# Patient Record
Sex: MALE | Race: WHITE | Employment: UNEMPLOYED | URBAN - METROPOLITAN AREA
[De-identification: names, ages, dates, MRNs, and addresses within clinical notes are randomized per-mention and may not be internally consistent; named-entity substitution may affect disease eponyms.]

---

## 2018-01-03 ENCOUNTER — HOSPITAL ENCOUNTER (OUTPATIENT)
Dept: SPEECH THERAPY | Age: 58
Discharge: HOME | End: 2018-01-03
Attending: INTERNAL MEDICINE | Admitting: INTERNAL MEDICINE

## 2018-01-03 DIAGNOSIS — R13.12 DYSPHAGIA, OROPHARYNGEAL: ICD-10-CM

## 2018-01-08 ENCOUNTER — PROCEDURE VISIT (OUTPATIENT)
Dept: CARDIOLOGY CLINIC | Age: 58
End: 2018-01-08

## 2018-01-08 ENCOUNTER — OFFICE VISIT (OUTPATIENT)
Dept: CARDIOLOGY CLINIC | Age: 58
End: 2018-01-08

## 2018-01-08 VITALS
DIASTOLIC BLOOD PRESSURE: 90 MMHG | HEART RATE: 78 BPM | BODY MASS INDEX: 46.48 KG/M2 | SYSTOLIC BLOOD PRESSURE: 148 MMHG | WEIGHT: 289.2 LBS | HEIGHT: 66 IN

## 2018-01-08 DIAGNOSIS — R01.1 CARDIAC MURMUR: ICD-10-CM

## 2018-01-08 DIAGNOSIS — I10 ESSENTIAL HYPERTENSION: ICD-10-CM

## 2018-01-08 DIAGNOSIS — R06.02 SOB (SHORTNESS OF BREATH): Primary | ICD-10-CM

## 2018-01-08 LAB
LV EF: 58 %
LVEF MODALITY: NORMAL

## 2018-01-08 PROCEDURE — G8427 DOCREV CUR MEDS BY ELIG CLIN: HCPCS | Performed by: INTERNAL MEDICINE

## 2018-01-08 PROCEDURE — G8484 FLU IMMUNIZE NO ADMIN: HCPCS | Performed by: INTERNAL MEDICINE

## 2018-01-08 PROCEDURE — 3017F COLORECTAL CA SCREEN DOC REV: CPT | Performed by: INTERNAL MEDICINE

## 2018-01-08 PROCEDURE — G8417 CALC BMI ABV UP PARAM F/U: HCPCS | Performed by: INTERNAL MEDICINE

## 2018-01-08 PROCEDURE — 93306 TTE W/DOPPLER COMPLETE: CPT | Performed by: INTERNAL MEDICINE

## 2018-01-08 PROCEDURE — 4004F PT TOBACCO SCREEN RCVD TLK: CPT | Performed by: INTERNAL MEDICINE

## 2018-01-08 PROCEDURE — 99203 OFFICE O/P NEW LOW 30 MIN: CPT | Performed by: INTERNAL MEDICINE

## 2018-01-08 RX ORDER — BUPROPION HYDROCHLORIDE 150 MG/1
150 TABLET, EXTENDED RELEASE ORAL 2 TIMES DAILY
Refills: 0 | COMMUNITY
Start: 2017-12-06

## 2018-01-08 RX ORDER — ATORVASTATIN CALCIUM 80 MG/1
80 TABLET, FILM COATED ORAL DAILY
Refills: 0 | COMMUNITY
Start: 2017-12-06

## 2018-01-08 NOTE — LETTER
Cardiology 100 [facility] [address] [facility] [address]  Phone: [phone]  Fax: [phone]    Montse Hidalgo MD        January 8, 2018     [address] [place] [address]    Patient: Hebert Crowley  MR Number: [id number]  YOB: 1960  Date of Visit: 1/8/2018    Dear Dr. Ann Rivera:    Thank you for the request for consultation for Joe Lopez to me for the evaluation of SOB  . Below are the relevant portions of my assessment and plan of care. If you have questions, please do not hesitate to call me. I look forward to following Minerva Citizen along with you.     Sincerely,        Montse Hidalgo MD

## 2018-01-08 NOTE — PATIENT INSTRUCTIONS
Please remember to bring all medication bottles or a medication list with you to your appointment. If you have any questions, please call our office at [phone]. Will check non- invasive Cardiac testing. OV for test results. Check Echo & Cardiolite perfusion imaging. Further recommendations based on test results.

## 2018-01-09 ENCOUNTER — TELEPHONE (OUTPATIENT)
Dept: CARDIOLOGY CLINIC | Age: 58
End: 2018-01-09

## 2018-01-17 ENCOUNTER — PROCEDURE VISIT (OUTPATIENT)
Dept: CARDIOLOGY CLINIC | Age: 58
End: 2018-01-17

## 2018-01-17 DIAGNOSIS — I10 ESSENTIAL HYPERTENSION: ICD-10-CM

## 2018-01-17 DIAGNOSIS — R06.02 SOB (SHORTNESS OF BREATH): ICD-10-CM

## 2018-01-17 DIAGNOSIS — R01.1 CARDIAC MURMUR: ICD-10-CM

## 2018-01-17 LAB
LV EF: 50 %
LVEF MODALITY: NORMAL

## 2018-01-17 PROCEDURE — 93018 CV STRESS TEST I&R ONLY: CPT | Performed by: INTERNAL MEDICINE

## 2018-01-17 PROCEDURE — A9500 TC99M SESTAMIBI: HCPCS | Performed by: INTERNAL MEDICINE

## 2018-01-17 PROCEDURE — 93016 CV STRESS TEST SUPVJ ONLY: CPT | Performed by: INTERNAL MEDICINE

## 2018-01-17 PROCEDURE — 78452 HT MUSCLE IMAGE SPECT MULT: CPT | Performed by: INTERNAL MEDICINE

## 2018-01-17 PROCEDURE — 93017 CV STRESS TEST TRACING ONLY: CPT | Performed by: INTERNAL MEDICINE

## 2018-01-18 ENCOUNTER — TELEPHONE (OUTPATIENT)
Dept: CARDIOLOGY CLINIC | Age: 58
End: 2018-01-18

## 2018-01-19 ENCOUNTER — OFFICE VISIT (OUTPATIENT)
Dept: CARDIOLOGY CLINIC | Age: 58
End: 2018-01-19

## 2018-01-19 ENCOUNTER — TELEPHONE (OUTPATIENT)
Dept: CARDIOLOGY CLINIC | Age: 58
End: 2018-01-19

## 2018-01-19 VITALS
SYSTOLIC BLOOD PRESSURE: 138 MMHG | HEART RATE: 80 BPM | DIASTOLIC BLOOD PRESSURE: 90 MMHG | WEIGHT: 289.8 LBS | HEIGHT: 66 IN | BODY MASS INDEX: 46.57 KG/M2

## 2018-01-19 DIAGNOSIS — I10 ESSENTIAL HYPERTENSION: ICD-10-CM

## 2018-01-19 DIAGNOSIS — I38 VHD (VALVULAR HEART DISEASE): ICD-10-CM

## 2018-01-19 DIAGNOSIS — R94.39 ABNORMAL NUCLEAR STRESS TEST: Primary | ICD-10-CM

## 2018-01-19 DIAGNOSIS — R01.1 CARDIAC MURMUR: ICD-10-CM

## 2018-01-19 DIAGNOSIS — R06.02 SOB (SHORTNESS OF BREATH): ICD-10-CM

## 2018-01-19 DIAGNOSIS — R42 DIZZINESS: ICD-10-CM

## 2018-01-19 PROCEDURE — 3017F COLORECTAL CA SCREEN DOC REV: CPT | Performed by: INTERNAL MEDICINE

## 2018-01-19 PROCEDURE — G8484 FLU IMMUNIZE NO ADMIN: HCPCS | Performed by: INTERNAL MEDICINE

## 2018-01-19 PROCEDURE — 4004F PT TOBACCO SCREEN RCVD TLK: CPT | Performed by: INTERNAL MEDICINE

## 2018-01-19 PROCEDURE — G8427 DOCREV CUR MEDS BY ELIG CLIN: HCPCS | Performed by: INTERNAL MEDICINE

## 2018-01-19 PROCEDURE — G8417 CALC BMI ABV UP PARAM F/U: HCPCS | Performed by: INTERNAL MEDICINE

## 2018-01-19 PROCEDURE — 99214 OFFICE O/P EST MOD 30 MIN: CPT | Performed by: INTERNAL MEDICINE

## 2018-01-19 RX ORDER — FLUTICASONE FUROATE AND VILANTEROL TRIFENATATE 100; 25 UG/1; UG/1
POWDER RESPIRATORY (INHALATION)
Refills: 0 | COMMUNITY
Start: 2018-01-12

## 2018-01-19 RX ORDER — LISINOPRIL 10 MG/1
TABLET ORAL
Refills: 0 | COMMUNITY
Start: 2018-01-12

## 2018-01-19 RX ORDER — ASPIRIN 81 MG/1
81 TABLET ORAL DAILY
Qty: 30 TABLET | Refills: 5 | Status: SHIPPED | OUTPATIENT
Start: 2018-01-19 | End: 2019-02-26

## 2018-01-19 NOTE — PATIENT INSTRUCTIONS
CAD:Abnormal nuclear stress test  -     CORONARY ARTERY DISEASE: Patient is currently  symptomatic from CAD. - changes in  treatment:   yes, add ASA           - Testing ordered:  yes, Cath  Winston classification: 2  FRAMINGHAM RISK SCORE:  LIANG RISK SCORE:  HTN:well controlled on current medical regimen, see list above. - changes in  treatment:   no   CARDIOMYOPATHY: None known   CONGESTIVE HEART FAILURE: NO KNOWN HISTORY.     VHD: Moderate AI  DYSLIPIDEMIA: Patient's profile is not available  OTHER RELEVANT DIAGNOSIS:as noted in patient's active problem list:Morbid Obesity  TESTS ORDERED: Aortic root angiography, Left & right heart Cath Possible PCI. Check Lipid profile. All previously ordered tests reviewed. ARRHYTHMIAS: None known   MEDICATIONS: CPM. Start taking baby ASA daily. Add Metoprolol 25 mg BID to regimen. Office f/u in six months. Primary/secondary prevention is the goal by aggressive risk modification, healthy and therapeutic life style changes for cardiovascular risk reduction. Various goals are discussed and multiple questions answered.

## 2018-01-19 NOTE — LETTER
This form has been fully explained to me. I understand its contents. Patients Signature: ___________________________________DATE/TIME  [email or web address]    If patient unable to sign, has engaged the [address] [place] [address], is a minor, or has a court-appointed Guardian:  [facility] Representative Name (Print):  ____________________________________      Relationship (Ashford one):    Guardian   Parent    Spouse    HCPOA   Child   Sibling  Next-of-Kin Friend    Patients Representative Signature: _______________________________________              Date: ______________  Time: __________    An  was used.    name/ID: _________________________________      [address] Witness________________________________ DATE/TIME  [email or web address]      Physician/Practitioner _______________________________  DATE/TIME  [email or web address]         Revision 6-                                          Isaias Allred    PROCEDURE TO SCHEDULE:    LEFT HEART CATHETERIZATION & RIGHT HEART CATHETERIZATION WITH POSSIBLE PERCUTANEOUS CORONARY INTERVENTION WITH AROTIC ROOT       Patient Name: Sg Zheng   : 1960   MRN# [id number]    Home Phone Number: [phone]   Weight:    Wt Readings from Last 3 Encounters:   18 289 lb 12.8 oz (131.5 kg)   18 289 lb 3.2 oz (131.2 kg)   18 285 lb (129.3 kg)        Insurance: Payor: Sandra York / Plan: Deyvi Wilson / Product Type: *No Product type* /     Date of Procedure: 2018 Time: 0800 Arrival Time: 0600    Diagnosis:  ABNORMAL STRESS Allergies: No Known Allergies     1) Call [facility] scheduling ([phone]) or Instant Message  CONFIRMED WITH     PHONE OR   INSTANT MESSAGE  2) PREAUTHORIZATION NUMBER:    Spoke to:      From date:     expiration date:        Sherrilyn School

## 2018-01-19 NOTE — ADDENDUM NOTE
Encounter addended by: Jayda Liu CMA on: 1/19/2018 11:53 AM<BR>    Actions taken: Letter status changed

## 2018-01-19 NOTE — PROGRESS NOTES
All previously ordered tests reviewed. ARRHYTHMIAS: None known   MEDICATIONS: CPM. Start taking baby ASA daily. Add Metoprolol 25 mg BID to regimen. Office f/u in six months. Primary/secondary prevention is the goal by aggressive risk modification, healthy and therapeutic life style changes for cardiovascular risk reduction. Various goals are discussed and multiple questions answered.

## 2018-01-24 ENCOUNTER — HOSPITAL ENCOUNTER (OUTPATIENT)
Dept: GENERAL RADIOLOGY | Age: 58
Discharge: HOME | End: 2018-01-24
Attending: INTERNAL MEDICINE | Admitting: INTERNAL MEDICINE

## 2018-01-24 ENCOUNTER — HOSPITAL ENCOUNTER (OUTPATIENT)
Dept: SLEEP CENTER | Age: 58
Discharge: HOME | End: 2018-01-24
Attending: INTERNAL MEDICINE | Admitting: INTERNAL MEDICINE

## 2018-01-24 ENCOUNTER — SURG/PROC ORDERS (OUTPATIENT)
Dept: CARDIOLOGY | Age: 58
End: 2018-01-24

## 2018-01-24 VITALS
SYSTOLIC BLOOD PRESSURE: 121 MMHG | WEIGHT: 292 LBS | DIASTOLIC BLOOD PRESSURE: 69 MMHG | HEART RATE: 68 BPM | HEIGHT: 66 IN | BODY MASS INDEX: 46.93 KG/M2

## 2018-01-24 DIAGNOSIS — G47.33 OSA (OBSTRUCTIVE SLEEP APNEA): Primary | ICD-10-CM

## 2018-01-24 LAB
ANION GAP SERPL CALCULATED.3IONS-SCNC: 14 MMOL/L (ref 4–16)
APTT: 26.8 SECONDS (ref 21.2–33)
BUN BLDV-MCNC: 14 MG/DL (ref 6–23)
CALCIUM SERPL-MCNC: 9.1 MG/DL (ref 8.3–10.6)
CHLORIDE BLD-SCNC: 104 MMOL/L (ref 99–110)
CHOLESTEROL: 153 MG/DL
CO2: 26 MMOL/L (ref 21–32)
CREAT SERPL-MCNC: 0.9 MG/DL (ref 0.9–1.3)
GFR AFRICAN AMERICAN: >60 ML/MIN/1.73M2
GFR NON-AFRICAN AMERICAN: >60 ML/MIN/1.73M2
GLUCOSE BLD-MCNC: 89 MG/DL (ref 70–99)
HCT VFR BLD CALC: 45.7 % (ref 42–52)
HDLC SERPL-MCNC: 50 MG/DL
HEMOGLOBIN: 14.9 GM/DL (ref 13.5–18)
INR BLD: 0.9 INDEX
LDL CHOLESTEROL DIRECT: 88 MG/DL
MCH RBC QN AUTO: 29.3 PG (ref 27–31)
MCHC RBC AUTO-ENTMCNC: 32.6 % (ref 32–36)
MCV RBC AUTO: 90 FL (ref 78–100)
PDW BLD-RTO: 13.2 % (ref 11.7–14.9)
PLATELET # BLD: 252 K/CU MM (ref 140–440)
PMV BLD AUTO: 9.3 FL (ref 7.5–11.1)
POTASSIUM SERPL-SCNC: 5 MMOL/L (ref 3.5–5.1)
PROTHROMBIN TIME: 10.4 SECONDS (ref 9.12–12.5)
RBC # BLD: 5.08 M/CU MM (ref 4.6–6.2)
SODIUM BLD-SCNC: 144 MMOL/L (ref 135–145)
TRIGL SERPL-MCNC: 112 MG/DL
WBC # BLD: 9.9 K/CU MM (ref 4–10.5)

## 2018-01-24 RX ORDER — DIPHENHYDRAMINE HCL 25 MG
25 TABLET ORAL
Status: CANCELLED | OUTPATIENT
Start: 2018-01-24 | End: 2018-01-24

## 2018-01-24 RX ORDER — SODIUM CHLORIDE 0.9 % (FLUSH) 0.9 %
10 SYRINGE (ML) INJECTION EVERY 12 HOURS SCHEDULED
Status: CANCELLED | OUTPATIENT
Start: 2018-01-24

## 2018-01-24 RX ORDER — SODIUM CHLORIDE 0.9 % (FLUSH) 0.9 %
10 SYRINGE (ML) INJECTION PRN
Status: CANCELLED | OUTPATIENT
Start: 2018-01-24

## 2018-01-24 RX ORDER — DIAZEPAM 5 MG/1
5 TABLET ORAL
Status: CANCELLED | OUTPATIENT
Start: 2018-01-24 | End: 2018-01-24

## 2018-01-24 RX ORDER — SODIUM CHLORIDE 9 MG/ML
INJECTION, SOLUTION INTRAVENOUS CONTINUOUS
Status: CANCELLED | OUTPATIENT
Start: 2018-01-24

## 2018-01-24 ASSESSMENT — SLEEP AND FATIGUE QUESTIONNAIRES
HOW LIKELY ARE YOU TO NOD OFF OR FALL ASLEEP WHILE SITTING INACTIVE IN A PUBLIC PLACE: 0
HOW LIKELY ARE YOU TO NOD OFF OR FALL ASLEEP WHILE SITTING AND TALKING TO SOMEONE: 0
HOW LIKELY ARE YOU TO NOD OFF OR FALL ASLEEP WHILE SITTING AND READING: 3
ESS TOTAL SCORE: 9
HOW LIKELY ARE YOU TO NOD OFF OR FALL ASLEEP WHEN YOU ARE A PASSENGER IN A CAR FOR AN HOUR WITHOUT A BREAK: 1
HOW LIKELY ARE YOU TO NOD OFF OR FALL ASLEEP IN A CAR, WHILE STOPPED FOR A FEW MINUTES IN TRAFFIC: 0
HOW LIKELY ARE YOU TO NOD OFF OR FALL ASLEEP WHILE WATCHING TV: 2
HOW LIKELY ARE YOU TO NOD OFF OR FALL ASLEEP WHILE SITTING QUIETLY AFTER LUNCH WITHOUT ALCOHOL: 0
HOW LIKELY ARE YOU TO NOD OFF OR FALL ASLEEP WHILE LYING DOWN TO REST IN THE AFTERNOON WHEN CIRCUMSTANCES PERMIT: 3
NECK CIRCUMFERENCE (INCHES): 19.75

## 2018-01-24 NOTE — PROGRESS NOTES
Briana Braxton, 1960, is scheduled for a psg at [facility] on  02/27/2018 at 2030      Electronically signed by Montrell Vega on 1/24/2018 at 10:40 AM

## 2018-01-24 NOTE — PROGRESS NOTES
for days 8-10 1/3/18   Gene Draper PA-C   albuterol sulfate HFA (PROVENTIL HFA) 108 (90 Base) MCG/ACT inhaler Inhale 2 puffs into the lungs every 4 hours as needed for Wheezing or Shortness of Breath With spacer (and mask if indicated). Thanks. 12/5/17 1/19/18  En Carlos MD       Allergies as of 01/24/2018    (No Known Allergies)       Patient Active Problem List   Diagnosis    SOB (shortness of breath)    Cardiac murmur    Hypertension    Dizziness    Abnormal nuclear stress test    VHD (valvular heart disease)    Class 3 obesity due to excess calories without serious comorbidity in adult    Essential hypertension       Past Medical History:   Diagnosis Date    Cardiac murmur     History of cardiovascular stress test 01/17/2018    nfero lateral, ischemic ST-T changes noted. Moderate Inferior wall Ischemia of a medium sized territory. Inferior wall Hypokinesis with low normal LV systolic function. LVEF is 50 %.  History of echocardiogram 01/08/2018    Left ventricular systolic function is normal with an ejection fraction of 55-60%. Grade I diastolic dysfunction. Mild concentric left ventricular hypertrophy. The left atrium is mildly dilated. Moderate aortic stenosis is present. Doppler evaluation reveals moderate aortic, and mild mitral and tricuspid regurgitation. No evidence of pericardial effusion.  Hypertension     SOB (shortness of breath)        No past surgical history on file. Family History   Problem Relation Age of Onset    Heart Attack Father      58         Objective:     Vitals:    01/24/18 1007   BP: 121/69   Site: Left Arm   Position: Sitting   Cuff Size: Large Adult   Pulse: 68   Weight: 292 lb (132.5 kg)   Height: 5' 6\" (1.676 m)     Neck circumference: 19.75  Inches  Bertrand - Total score: 9    Gen: No distress. Eyes: PERRL. No sclera icterus. No conjunctival injection. ENT: No discharge. Pharynx clear.  External appearance of ears and nose normal.  Neck: Trachea DESIRE                   Hypersomnia     Plan:        Sleep Study:     [x]  Sleep hygiene/ relaxation methods & CBTi principles review with patient     []  HST - Home Sleep Study   [x]  PSG - Overnight Diagnostic Polysomnogram     []  CPAP Titration    [] Split Night Study    [] BiLevel Titration    [] ASV - Auto-Servo Ventilation Titration       []  MSLT - Multiple Sleep Latency Test   []  MWT - Maintenance of Wakefulness Test    CPAP Therapy:     []  Patient to be seen for new mask fitting/desensitization   []  AutoPAP Titration    []  CPAP supplies and equipment at ________cmH2O    []  Continue same CPAP pressure   []  Change CPAP pressure to _______cm H2O   []  CPAP supplies only, no pressure change   []  Refer for an oral appliance       Medications:       []  Continue current medication    []  Add Medication:  ________________    Follow-Up:     []  No follow up required. Patient to return as needed. []  2 weeks   []  4 weeks   []  2 months   []  4 months   []  6 months   []  1 year for CPAP compliance evaluation. Patient to return sooner, as needed. [x]  Follow up after sleep study   []  Other: ______________    No orders of the defined types were placed in this encounter.          Electronically signed by Sj Starr MD on 1/24/2018 at 10:22 AM

## 2018-02-06 ENCOUNTER — OFFICE VISIT (OUTPATIENT)
Dept: CARDIOLOGY CLINIC | Age: 58
End: 2018-02-06

## 2018-02-06 VITALS
HEIGHT: 66 IN | WEIGHT: 292.4 LBS | SYSTOLIC BLOOD PRESSURE: 120 MMHG | DIASTOLIC BLOOD PRESSURE: 82 MMHG | BODY MASS INDEX: 46.99 KG/M2 | HEART RATE: 74 BPM

## 2018-02-06 DIAGNOSIS — I27.20 PULMONARY HTN (HCC): ICD-10-CM

## 2018-02-06 DIAGNOSIS — R01.1 CARDIAC MURMUR: ICD-10-CM

## 2018-02-06 DIAGNOSIS — I38 VHD (VALVULAR HEART DISEASE): ICD-10-CM

## 2018-02-06 DIAGNOSIS — I10 ESSENTIAL HYPERTENSION: Primary | ICD-10-CM

## 2018-02-06 DIAGNOSIS — R06.02 SOB (SHORTNESS OF BREATH): ICD-10-CM

## 2018-02-06 PROCEDURE — G8417 CALC BMI ABV UP PARAM F/U: HCPCS | Performed by: INTERNAL MEDICINE

## 2018-02-06 PROCEDURE — 99214 OFFICE O/P EST MOD 30 MIN: CPT | Performed by: INTERNAL MEDICINE

## 2018-02-06 PROCEDURE — G8484 FLU IMMUNIZE NO ADMIN: HCPCS | Performed by: INTERNAL MEDICINE

## 2018-02-06 PROCEDURE — 3017F COLORECTAL CA SCREEN DOC REV: CPT | Performed by: INTERNAL MEDICINE

## 2018-02-06 PROCEDURE — G8427 DOCREV CUR MEDS BY ELIG CLIN: HCPCS | Performed by: INTERNAL MEDICINE

## 2018-02-06 PROCEDURE — 4004F PT TOBACCO SCREEN RCVD TLK: CPT | Performed by: INTERNAL MEDICINE

## 2018-02-06 NOTE — LETTER
Cardiology 100 [facility] [address] [facility] [address]  Phone: [phone]  Fax: [phone]    Varun Rice MD        February 6, 2018     [address] [place] [address]    Patient: Mariana Beckford  MR Number: [id number]  YOB: 1960  Date of Visit: 2/6/2018    Dear Dr. Anita Gascaor:    Thank you for the request for consultation for Lisbeth Vyas to me for the evaluation of VHD. Below are the relevant portions of my assessment and plan of care. If you have questions, please do not hesitate to call me. I look forward to following Kalpana Moeller along with you.     Sincerely,        Varun Rice MD

## 2018-02-06 NOTE — PATIENT INSTRUCTIONS
CAD:No   HTN:well controlled on current medical regimen, see list above. - changes in  treatment:   no   CARDIOMYOPATHY: None known   CONGESTIVE HEART FAILURE: NO KNOWN HISTORY.   VHD: Moderate AI  DYSLIPIDEMIA: Patient's profile is at / near [employer],                                                              Tolerating current medical regimen wellyes,                                                           See most recent Lab values in Labs section above. OTHER RELEVANT DIAGNOSIS:as noted in patient's active problem list:PULMONARY HTN, PATIENT SEEING DR. Paula. Obesity: Refer to weight loss clinic  TESTS ORDERED: None this visit                                     All previously ordered tests reviewed. ARRHYTHMIAS: None known   MEDICATIONS: CPM   Office f/u in six months. Primary/secondary prevention is the goal by aggressive risk modification, healthy and therapeutic life style changes for cardiovascular risk reduction. Various goals are discussed and multiple questions answered.

## 2018-02-06 NOTE — PROGRESS NOTES
OFFICE PROGRESS NOTES      Urszula Villatoro is a 61 y.o. male who has    CHIEF COMPLAINT AS FOLLOWS:  CHEST PAIN: Patient denies any C/O chest pains at this time. SOB:   Has SOB with exertion but no change over previous noted. LEG EDEMA: No leg edema   PALPITATIONS: Denies any C/O Palpitations                              . DIZZINESS: No C/O Dizziness                         SYNCOPE: None   OTHER:                                     HPI: Patient is here for F/U on his VHD, HTN & Dyslipidemia problems. He does not have any new complaints at this time.     Edel Smart has the following history recorded in care path:  Patient Active Problem List    Diagnosis Date Noted    Pulmonary HTN 02/06/2018     Priority: High    Dizziness 01/19/2018     Priority: High    Abnormal nuclear stress test 01/19/2018     Priority: High    VHD (valvular heart disease) 01/19/2018     Priority: Low    Class 3 obesity due to excess calories without serious comorbidity in adult 01/19/2018     Priority: Low    Essential hypertension 01/19/2018     Priority: Low    SOB (shortness of breath)      Priority: Low    Cardiac murmur      Priority: Low    Hypertension      Priority: Low     Current Outpatient Prescriptions   Medication Sig Dispense Refill    BREO ELLIPTA 100-25 MCG/INH AEPB inhaler INHALE 1 PUFF EVERY DAY AT THE SAME TIME EACH DAY  0    lisinopril (PRINIVIL;ZESTRIL) 10 MG tablet take 1 tablet by mouth once daily  0    aspirin EC 81 MG EC tablet Take 1 tablet by mouth daily 30 tablet 5    metoprolol tartrate (LOPRESSOR) 25 MG tablet Take 1 tablet by mouth 2 times daily 60 tablet 5    atorvastatin (LIPITOR) 80 MG tablet Take 80 mg by mouth daily   0    buPROPion (WELLBUTRIN SR) 150 MG extended release tablet Take 150 mg by mouth 2 times daily   0    albuterol sulfate HFA (PROVENTIL HFA) 108 (90 Base) MCG/ACT inhaler Inhale 2 puffs into the frequency/urgency  Musculoskeletal: Negative for muscle pain, muscular weakness, negative for pain in arm and leg or swelling in foot and leg  Neurological: Negative for dizziness, headaches, memory loss, numbness/tingling, visual changes, syncope  Dermatological: Negative for rash    Objective:  /82   Pulse 74   Ht 5' 6\" (1.676 m)   Wt 292 lb 6.4 oz (132.6 kg)   BMI 47.19 kg/m²   Wt Readings from Last 3 Encounters:   02/06/18 292 lb 6.4 oz (132.6 kg)   02/05/18 290 lb (131.5 kg)   01/25/18 292 lb (132.5 kg)     Body mass index is 47.19 kg/m². GENERAL - Alert, oriented, pleasant, in no apparent distress. EYES: No jaundice, no conjunctival pallor. SKIN: It is warm & dry. No rashes. No Echhymosis    HEENT  No clinically significant abnormalities seen. Neck - Supple. No jugular venous distention noted. No carotid bruits. Cardiovascular  Normal S1 and S2 without obvious murmur or gallop. Extremities - No cyanosis, clubbing, or significant edema. Pulmonary  No respiratory distress. No wheezes or rales. Abdomen  No masses, tenderness, or organomegaly. Musculoskeletal  No significant edema. No joint deformities. No muscle wasting. Neurologic  Cranial nerves II through XII are grossly intact. There were no gross focal neurologic abnormalities.     Lab Review   Lab Results   Component Value Date    TROPONINT <0.010 01/03/2018     BNP:  No results found for: BNP  PT/INR:    Lab Results   Component Value Date    INR 0.90 01/24/2018     No results found for: LABA1C  Lab Results   Component Value Date    WBC 9.9 01/24/2018    HCT 45.7 01/24/2018    MCV 90.0 01/24/2018     01/24/2018     Lab Results   Component Value Date    CHOL 153 01/24/2018    TRIG 112 01/24/2018    HDL 50 01/24/2018    LDLDIRECT 88 01/24/2018     Lab Results   Component Value Date    ALT 30 01/03/2018    AST 17 01/03/2018     BMP:    Lab Results   Component Value Date     01/24/2018    K 5.0 01/24/2018    CL

## 2018-02-27 ENCOUNTER — HOSPITAL ENCOUNTER (OUTPATIENT)
Dept: SLEEP CENTER | Age: 58
Discharge: HOME | End: 2018-02-26
Attending: PSYCHIATRY & NEUROLOGY | Admitting: PSYCHIATRY & NEUROLOGY

## 2018-02-27 VITALS — HEIGHT: 66 IN | WEIGHT: 180 LBS | BODY MASS INDEX: 28.93 KG/M2

## 2018-03-08 ENCOUNTER — HOSPITAL ENCOUNTER (OUTPATIENT)
Dept: PULMONOLOGY | Age: 58
Discharge: HOME | End: 2018-03-08
Attending: INTERNAL MEDICINE | Admitting: INTERNAL MEDICINE

## 2018-03-26 PROBLEM — G47.33 SEVERE OBSTRUCTIVE SLEEP APNEA: Status: ACTIVE | Noted: 2018-03-26

## 2018-03-26 PROBLEM — J44.9 MODERATE COPD (CHRONIC OBSTRUCTIVE PULMONARY DISEASE) (HCC): Status: ACTIVE | Noted: 2018-03-26

## 2018-09-26 ENCOUNTER — HOSPITAL ENCOUNTER (OUTPATIENT)
Dept: SLEEP CENTER | Age: 58
Discharge: HOME | End: 2018-09-26
Attending: PSYCHIATRY & NEUROLOGY | Admitting: PSYCHIATRY & NEUROLOGY
Payer: MEDICAID

## 2018-09-26 ASSESSMENT — SLEEP AND FATIGUE QUESTIONNAIRES
ESS TOTAL SCORE: 8
HOW LIKELY ARE YOU TO NOD OFF OR FALL ASLEEP WHILE SITTING AND READING: 3
HOW LIKELY ARE YOU TO NOD OFF OR FALL ASLEEP WHILE LYING DOWN TO REST IN THE AFTERNOON WHEN CIRCUMSTANCES PERMIT: 3
HOW LIKELY ARE YOU TO NOD OFF OR FALL ASLEEP WHILE SITTING QUIETLY AFTER LUNCH WITHOUT ALCOHOL: 2
HOW LIKELY ARE YOU TO NOD OFF OR FALL ASLEEP WHILE SITTING AND TALKING TO SOMEONE: 0
HOW LIKELY ARE YOU TO NOD OFF OR FALL ASLEEP WHILE SITTING INACTIVE IN A PUBLIC PLACE: 0
HOW LIKELY ARE YOU TO NOD OFF OR FALL ASLEEP IN A CAR, WHILE STOPPED FOR A FEW MINUTES IN TRAFFIC: 0
HOW LIKELY ARE YOU TO NOD OFF OR FALL ASLEEP WHILE WATCHING TV: 0
HOW LIKELY ARE YOU TO NOD OFF OR FALL ASLEEP WHEN YOU ARE A PASSENGER IN A CAR FOR AN HOUR WITHOUT A BREAK: 0

## 2018-09-26 NOTE — PROGRESS NOTES
conjunctival injection. ENT: No discharge. Pharynx clear. External appearance of ears and nose normal.  Neck: Trachea midline. No obvious mass. Resp: No accessory muscle use. No crackles. No wheezes. No rhonchi. No dullness on percussion. CV: Regular rate. Regular rhythm. No murmur or rub. No edema. GI: Non-tender. Non-distended. No hernia. Skin: Warm, dry, normal texture and turgor. No nodule on exposed extremities. Lymph: No cervical LAD. No supraclavicular LAD. M/S: No cyanosis. No clubbing. No joint deformity. Psych: Oriented x 3. No anxiety. Awake. Alert. Intact judgement and insight.     Mallampati Airway Classification:   []1 []2 []3 [x]4        Sleep Complaints/Symptoms:    Normal Bedtime:      Normal Wake Time:   Average Sleep Time:  6-7 hrs    Number of Awakenings:  2-3 / night; NOW 1-2 / NIGHT    Duration of Sleep Complaints: 10 12 YRS years    [x] Snoring     [x] Improved [] Not Improved    [x] Choking/Gasping for Breath  [x] Improved [] Not Improved       [x] Witnessed Apneas              [x] Improved [] Not Improved  [x] Daytime Sleepiness             [x] Improved [] Not Improved  [] Morning Headaches    [] Improved [] Not Improved  [x] Frequent Awakenings       [x] Improved [] Not Improved  [] Jerky Movements   [] Improved [] Not Improved   [] Restless Legs   [] Improved [] Not Improved   [] Difficulty Initiating Sleep  [] Improved [] Not Improved   [] Difficulty Maintaining Sleep  [] Improved [] Not Improved   [] Restless Sleep    [] Improved [] Not Improved   [] Sleep Paralysis    [] Improved [] Not Improved   [] Muscle Weakness w/ Emotion  [] Improved [] Not Improved  [] Other :     CPAP Usage:    [x]  Patient has never worn CPAP  []  Patient has worn CPAP previously but discontinued use  []  Current PAP user,  [years]   []  Patient Tolerates Well   []  Patient Does Not Tolerate     []  Patient Uses CPAP      []  More Than 4 Hours      []  Less Than 4 Hours  []  CPAP/BPAP/ASV Pressure Readings   []  CPAP Pressure      cm H20   []  BPAP Pressure       cm H20   []  ASV Pressure         cm H20      Assessment:      Diagnosis:  DESIRE                   Hypersomnia                    CPAP  8 CM     Plan:        Sleep Study:     [x]  Sleep hygiene/ relaxation methods & CBTi principles review with patient     []  HST - Home Sleep Study   []  PSG - Overnight Diagnostic Polysomnogram     []  CPAP Titration    [] Split Night Study    [] BiLevel Titration    [] ASV - Auto-Servo Ventilation Titration       []  MSLT - Multiple Sleep Latency Test   []  MWT - Maintenance of Wakefulness Test    CPAP Therapy:     []  Patient to be seen for new mask fitting/desensitization   []  AutoPAP Titration    []  CPAP supplies and equipment at ________cmH2O    []  Continue same CPAP pressure  8 CM   []  Change CPAP pressure to _______cm H2O   []  CPAP supplies only, no pressure change   []  Refer for an oral appliance       Medications:       []  Continue current medication    []  Add Medication:  ________________    Follow-Up:     []  No follow up required. Patient to return as needed. []  2 weeks   []  4 weeks   []  2 months   []  4 months   [x]  6 months   []  1 year for CPAP compliance evaluation. Patient to return sooner, as needed. [x]  Follow up after sleep study   []  Other: ______________    No orders of the defined types were placed in this encounter.          Electronically signed by Zoe Huddleston MD on 9/26/2018 at 10:02 AM

## 2019-02-26 ENCOUNTER — OFFICE VISIT (OUTPATIENT)
Dept: ORTHOPEDIC SURGERY | Age: 59
End: 2019-02-26
Payer: MEDICAID

## 2019-02-26 VITALS
HEART RATE: 73 BPM | WEIGHT: 278 LBS | OXYGEN SATURATION: 93 % | RESPIRATION RATE: 16 BRPM | BODY MASS INDEX: 44.87 KG/M2

## 2019-02-26 DIAGNOSIS — M75.81 ROTATOR CUFF TENDONITIS, RIGHT: Primary | ICD-10-CM

## 2019-02-26 DIAGNOSIS — R52 PAIN: ICD-10-CM

## 2019-02-26 PROCEDURE — 3017F COLORECTAL CA SCREEN DOC REV: CPT | Performed by: PHYSICIAN ASSISTANT

## 2019-02-26 PROCEDURE — G8417 CALC BMI ABV UP PARAM F/U: HCPCS | Performed by: PHYSICIAN ASSISTANT

## 2019-02-26 PROCEDURE — 99202 OFFICE O/P NEW SF 15 MIN: CPT | Performed by: PHYSICIAN ASSISTANT

## 2019-02-26 PROCEDURE — G8484 FLU IMMUNIZE NO ADMIN: HCPCS | Performed by: PHYSICIAN ASSISTANT

## 2019-02-26 PROCEDURE — G8427 DOCREV CUR MEDS BY ELIG CLIN: HCPCS | Performed by: PHYSICIAN ASSISTANT

## 2019-02-26 PROCEDURE — 4004F PT TOBACCO SCREEN RCVD TLK: CPT | Performed by: PHYSICIAN ASSISTANT

## 2019-02-26 RX ORDER — LISINOPRIL 20 MG/1
TABLET ORAL
Refills: 0 | COMMUNITY
Start: 2019-02-01

## 2019-02-26 RX ORDER — BUPROPION HYDROCHLORIDE 100 MG/1
TABLET ORAL
COMMUNITY
End: 2019-04-17 | Stop reason: SDUPTHER

## 2019-02-26 RX ORDER — ATORVASTATIN CALCIUM 80 MG/1
TABLET, FILM COATED ORAL
COMMUNITY

## 2019-02-26 RX ORDER — BUPROPION HYDROCHLORIDE 150 MG/1
TABLET, EXTENDED RELEASE ORAL
COMMUNITY
End: 2019-04-17 | Stop reason: SDUPTHER

## 2019-03-04 ENCOUNTER — TELEPHONE (OUTPATIENT)
Dept: CARDIOLOGY CLINIC | Age: 59
End: 2019-03-04

## 2019-03-18 ASSESSMENT — ENCOUNTER SYMPTOMS: SHORTNESS OF BREATH: 0

## 2019-04-16 ENCOUNTER — HOSPITAL ENCOUNTER (OUTPATIENT)
Dept: PHYSICAL THERAPY | Age: 59
Setting detail: THERAPIES SERIES
Discharge: HOME | End: 2019-04-16
Payer: MEDICAID

## 2019-04-16 PROCEDURE — 97162 PT EVAL MOD COMPLEX 30 MIN: CPT

## 2019-04-16 PROCEDURE — 97110 THERAPEUTIC EXERCISES: CPT

## 2019-04-16 ASSESSMENT — PAIN DESCRIPTION - LOCATION: LOCATION: SHOULDER

## 2019-04-16 ASSESSMENT — PAIN SCALES - GENERAL: PAINLEVEL_OUTOF10: 1

## 2019-04-16 ASSESSMENT — PAIN DESCRIPTION - DESCRIPTORS: DESCRIPTORS: ACHING;SHARP

## 2019-04-16 ASSESSMENT — PAIN DESCRIPTION - ORIENTATION: ORIENTATION: RIGHT

## 2019-04-16 ASSESSMENT — PAIN DESCRIPTION - FREQUENCY: FREQUENCY: INTERMITTENT

## 2019-04-16 NOTE — PROGRESS NOTES
Physical Therapy  Initial Assessment  Date: 2019  Patient Name: Seble Hayes  MRN: [id number]  : 1960     Treatment Diagnosis: R shoulder pain, impaired function right shoulder, postural weakness    Restrictions  Position Activity Restriction  Other position/activity restrictions: No formal restrictions    Subjective   General  Chart Reviewed: Yes  Patient assessed for rehabilitation services?: Yes  Additional Pertinent Hx: PMH:  COPD, emphysema, HTN, sleep apnea, HLP, wears C-Pap  Family / Caregiver Present: Yes  Referring Practitioner: RADHA Sales  Diagnosis: Rt shoulder RC tendonitis  Follows Commands: Within Functional Limits  PT Visit Information  Onset Date: (approximately 4 months, insidous onset)  PT Insurance Information: [facility]  Total # of Visits Approved: 30  Subjective  Subjective: Woke up approx 4 months ago w/right shoulder pain which has not resolved. Pt reports also having left shoulder pain. Recent right shoulder x-ray in 2019 (-) acute findings. PLOF:  Did not have shoulder pain or significant weakness in arms. Did not have functional limitations. Since the onset of pain, increased arm weakness. Has to compensate while lifting and upper body dressing. Pt reports difficulty sleeping and driving. Will wake up approx 1-2x/wk d/t pain. Pain Screening  Patient Currently in Pain: Yes  Pain Assessment  Pain Assessment: 0-10  Pain Level: 1  Pain Location: Shoulder  Pain Orientation: Right  Pain Descriptors: Aching; Sharp  Pain Frequency: Intermittent  Vital Signs  Patient Currently in Pain: Yes    Vision/Hearing  Vision  Vision: Impaired(corrective glasses)  Hearing  Hearing: Within functional limits    Orientation  Orientation  Overall Orientation Status: Within Normal Limits    Social/Functional History  Social/Functional History  Lives With: Alone  Type of Home: House  ADL Assistance: Independent  Homemaking Assistance: Independent  Homemaking Responsibilities: Yes  Ambulation Assistance: Independent  Transfer Assistance: Independent  Active : Yes  Mode of Transportation: Car    Objective     Observation/Palpation  Posture: Fair(FHP, rounded forward shoulders, protracted scapulae)  Palpation: TTP right AC joint soft tissue (supraspinatus insertion)    PROM RUE (degrees)  RUE General PROM: supine shoulder:  flex 150 deg, abduction 110 deg  AROM RUE (degrees)  RUE General AROM: sitting shoulder:  scaption 120 deg, abduction 90 deg, ER w/Apley scratch C8, IR w/Apley scratch L1  PROM LUE (degrees)  LUE PROM: WFL  Spine  Cervical: WFL; Min limitation all directions but pain free    Strength RUE  Comment: shoulder flexion and abd 3-/5, IR 4/5, ER 4-/5, elbow flex/ext 5/5  Strength LUE  Strength LUE: WFL  Comment: 4 to 4+/5 shoulder, 5/5 elbow     Additional Measures  Special Tests:  RUE:  114# (right hand dom), #  Other: (+) impingement Rt shoulder w/crossover test, Seb. Able to perform empty can w/resistance, however painful. Sensation  Overall Sensation Status: WFL  Bed mobility  Supine to Sit: Independent  Sit to Supine: Independent  Transfers  Sit to Stand: Modified independent  Stand to sit: Modified independent      Assessment   Conditions Requiring Skilled Therapeutic Intervention  Body structures, Functions, Activity limitations: Decreased functional mobility ; Decreased ADL status; Decreased ROM; Decreased strength; Increased Pain  Assessment: Patient is a 60 y.o. male w/DX R rotator cuff tendonitis. Pt states that he Woke up approx 4 months ago w/right shoulder pain which has not resolved. Pt reports also having left shoulder pain. Recent right shoulder x-ray in Feb 2019 (-) acute findings. PLOF:  Did not have shoulder pain or significant weakness in arms. Did not have functional limitations. Since the onset of pain, increased arm weakness. Has to compensate while lifting and upper body dressing.   Pt reports difficulty sleeping and driving. Will wake from sleep approx 1-2x/wk d/t pain. Today, patient does present w/symptoms consistent w/RC tendonitis/impingement syndrome and will benefit from physical therapy. Treatment Diagnosis: R shoulder pain, impaired function right shoulder, postural weakness  Prognosis: Good  Decision Making: Medium Complexity  History: PMH:  COPD, emphysema, HTN, sleep apnea, HLP, wears C-Pap  Exam: MMT, ROM,  strength  Clinical Presentation: Medium complexity  Patient Education: see daily notes  Barriers to Learning: None   REQUIRES PT FOLLOW UP: Yes  Treatment Initiated : see daily notes  Activity Tolerance  Activity Tolerance: Patient Tolerated treatment well         Plan   Plan  Times per week: 2  Plan weeks: 8  Current Treatment Recommendations: Strengthening, ROM, Neuromuscular Re-education, Manual Therapy - Soft Tissue Mobilization, Home Exercise Program, Pain Management, Manual Therapy - Joint Manipulation, Patient/Caregiver Education & Training, Modalities, Integrated Dry Needling    G-Code  PT G-Codes  Functional Assessment Tool Used: Thersa Scales   Score: 23      Goals  Long term goals  Time Frame for Long term goals : In 8 weeks, patient will  Long term goal 1: demonstrate compliance and independence w/HEP. Long term goal 2: improve upon Quick Dash score as indication of functional improvement. Long term goal 3: demonstrate right shoulder AROM to WFL/pain-free. Long term goal 4: increase right shoulder strength to >= 4/5 MMGs for improved function and self-care.   Patient Goals   Patient goals : No shoulder pain       Therapy Time   Individual Concurrent Group Co-treatment   Time In 1304         Time Out 1355         Minutes 51         Timed Code Treatment Minutes: 939 Agustina Castrejon, PT

## 2019-04-16 NOTE — FLOWSHEET NOTE
Outpatient Physical Therapy  [place]           [x] Phone: [phone]   Fax: [phone]  [place]           [] Phone: [phone]   Fax: [phone]        Physical Therapy Daily Treatment Note  Date:  2019    Patient Name:  Ludmila Alexander    :  1960  MRN: [id number]  Restrictions/Precautions: Other position/activity restrictions: No formal restrictions  Diagnosis:   Diagnosis: Rt shoulder RC tendonitis  Date of Injury/Surgery: 4 months  Treatment Diagnosis: Treatment Diagnosis: R shoulder pain, impaired function right shoulder, postural weakness    Insurance/Certification information: PT Insurance Information: [place]   Referring Physician:  Referring Practitioner: RADHA Washington  Next Doctor Visit:  Unknown  Plan of care signed (Y/N):  N  Outcome Measure: Thersa Scales 23  Visit# / total visits:    Pain level: 1/10 (ache)  Goals:     POC Date Range:  19 - 19         Long term goals  Time Frame for Long term goals : In 8 weeks, patient will  Long term goal 1: demonstrate compliance and independence w/HEP. Long term goal 2: improve upon Quick Dash score as indication of functional improvement. Long term goal 3: demonstrate right shoulder AROM to WFL/pain-free. Long term goal 4: increase right shoulder strength to >= 4/5 MMGs for improved function and self-care. Summary of Evaluation: Assessment: Patient is a 60 y.o. male w/DX R rotator cuff tendonitis. Pt states that he Woke up approx 4 months ago w/right shoulder pain which has not resolved. Pt reports also having left shoulder pain. Recent right shoulder x-ray in 2019 (-) acute findings. PLOF:  Did not have shoulder pain or significant weakness in arms. Did not have functional limitations. Since the onset of pain, increased arm weakness. Has to compensate while lifting and upper body dressing. Pt reports difficulty sleeping and driving. Will wake from sleep approx 1-2x/wk d/t pain.   Today, patient does present w/symptoms consistent w/RC tendonitis/impingement syndrome and will benefit from physical therapy. Subjective:  See eval         Any changes in Ambulatory Summary Sheet? None        Objective:  See eval           Exercises: (No more than 4 columns)   Exercise/Equipment Date 4/16/19  #1 Date Date           WARM UP                     TABLE                                       STANDING      Chin Tucks 1 x 10     pec stretch - door 3 x 30 sec     Low rows RTB  2 x 10     RUE ER RTB  2 x 10                            PROPRIOCEPTION                                    MODALITIES                      Other Therapeutic Activities/Education:    Postural awareness/instruction. VC/TC/Demo for proper technique and TC for muscle recruitment given to ensure maximum therapeutic benefit and functional outcome. Home Exercise Program:    Chin tucks  Low rows RTB  R shoulder ER RTB  pec stretch door    Manual Treatments:    N/A    Modalities:    N/A    Communication with other providers:    POC sent to Dr. Moreno Kellogg:  (Response towards treatment session) (Pain Rating)  2/10 shoulder ache post exercise. Assessment: Patient is a 60 y.o. male w/DX R rotator cuff tendonitis. Pt states that he Woke up approx 4 months ago w/right shoulder pain which has not resolved. Pt reports also having left shoulder pain. Recent right shoulder x-ray in Feb 2019 (-) acute findings. PLOF:  Did not have shoulder pain or significant weakness in arms. Did not have functional limitations. Since the onset of pain, increased arm weakness. Has to compensate while lifting and upper body dressing. Pt reports difficulty sleeping and driving. Will wake from sleep approx 1-2x/wk d/t pain. Today, patient does present w/symptoms consistent w/RC tendonitis/impingement syndrome and will benefit from physical therapy.       Plan for Next Session:    R GH joint mobilization  Postural strengthening  RC PREs as

## 2019-04-16 NOTE — PLAN OF CARE
care home management        [x]Dry needling trigger point point/pain management      Plan of Care Date Range:   4/16/19 - 6/11/19    ? Frequency/Duration:  # Days per week: [] 1 day # Weeks: [] 1 week [] 5 weeks     [x] 2 days? [] 2 weeks [] 6 weeks     [] 3 days   [] 3 weeks [] 7 weeks     [] 4 days   [] 4 weeks [x] 8 weeks    Rehab Potential: [] Excellent [x] Good [] Fair  [] Poor     Goals:     Long term goals  Time Frame for Long term goals : In 8 weeks, patient will  Long term goal 1: demonstrate compliance and independence w/HEP. Long term goal 2: improve upon Quick Dash score as indication of functional improvement. Long term goal 3: demonstrate right shoulder AROM to WFL/pain-free. Long term goal 4: increase right shoulder strength to >= 4/5 MMGs for improved function and self-care. Electronically signed by:  Ryan Hidalgo PT, 4/16/2019, 6:18 PM          If you have any questions or concerns, please don't hesitate to call.   Thank you for your referral.    Physician Signature:_________________Date:____________Time: ________  By signing above, therapists plan is approved by physician

## 2019-04-17 ENCOUNTER — HOSPITAL ENCOUNTER (OUTPATIENT)
Dept: SLEEP CENTER | Age: 59
Discharge: HOME | End: 2019-04-17
Payer: MEDICAID

## 2019-04-17 PROCEDURE — 9990000010 HC NO CHARGE VISIT: Performed by: PSYCHIATRY & NEUROLOGY

## 2019-04-17 NOTE — PROGRESS NOTES
Ravi Rivera MD, Dottie Lefort MD, Marie Garcia MD, [facility]    [address] [facility], [address] [facility]   [place]: [phone]  F: [phone]     Subjective:     Patient ID: Prabhu Chung is a 60 y.o. male, referred to the sleep center for   Chief Complaint   Patient presents with    Sleep Apnea    6 Month Follow-Up   . Referring physician:  Dr Gabby Bishop     History:  EDS  FOR 10-12 YRS                  Frequent awakenings   HAS BEEN SNORING FOR MANY YRS. TOSSES & TURNS IN SLEEP    PSG          AHI 63;   GIVEN CPAP 1ST NIGHT AT  8 CM  TOLERATES WELL. SLEEPS BETTER.  NOT WAKIG OFTEN DURING SLEEP. NOT AS TIRED OR SLEEPY DURING DAY      19  Has been using CPAP regularly  Tolerates well. Sleeps better for 6 hrs. Wakes up not much. Not v sleepy during sleep. Overall feels better.          Social History     Socioeconomic History    Marital status: Single     Spouse name: Not on file    Number of children: Not on file    Years of education: Not on file    Highest education level: Not on file   Occupational History    Not on file   Social Needs    Financial resource strain: Not on file    Food insecurity:     Worry: Not on file     Inability: Not on file    Transportation needs:     Medical: Not on file     Non-medical: Not on file   Tobacco Use    Smoking status: Former Smoker     Types: Cigarettes     Last attempt to quit: 2015     Years since quittin.1    Smokeless tobacco: Current User     Types: Snuff   Substance and Sexual Activity    Alcohol use: No    Drug use: No    Sexual activity: Not on file   Lifestyle    Physical activity:     Days per week: Not on file     Minutes per session: Not on file    Stress: Not on file   Relationships    Social connections:     Talks on phone: Not on file     Gets together: Not on file     Attends Evangelical service: Not on file     Active member of club or organization: Not on file     Attends meetings of clubs or organizations: Not on file     Relationship status: Not on file    Intimate partner violence:     Fear of current or ex partner: Not on file     Emotionally abused: Not on file     Physically abused: Not on file     Forced sexual activity: Not on file   Other Topics Concern    Not on file   Social History Narrative    Not on file       Prior to Admission medications    Medication Sig Start Date End Date Taking? Authorizing Provider   lisinopril (PRINIVIL;ZESTRIL) 20 MG tablet  2/1/19  Yes Historical Provider, MD   atorvastatin (LIPITOR) 80 MG tablet atorvastatin 80 mg tablet   Yes Historical Provider, MD   CPAP Machine MISC by CPAP route nightly   Yes Historical Provider, MD   lisinopril (PRINIVIL;ZESTRIL) 10 MG tablet take 1 tablet by mouth once daily 1/12/18  Yes Historical Provider, MD   atorvastatin (LIPITOR) 80 MG tablet Take 80 mg by mouth daily  12/6/17  Yes Historical Provider, MD   buPROPion (WELLBUTRIN SR) 150 MG extended release tablet Take 150 mg by mouth 2 times daily  12/6/17  Yes Historical Provider, MD SAMUEL ELLIPTA 100-25 MCG/INH AEPB inhaler INHALE 1 PUFF EVERY DAY AT THE SAME TIME EACH DAY 1/12/18   Historical Provider, MD   albuterol sulfate HFA (PROVENTIL HFA) 108 (90 Base) MCG/ACT inhaler Inhale 2 puffs into the lungs every 4 hours as needed for Wheezing or Shortness of Breath With spacer (and mask if indicated). Thanks.  12/5/17 9/24/18  Ayad Rose MD       Allergies as of 04/17/2019    (No Known Allergies)       Patient Active Problem List   Diagnosis    SOB (shortness of breath)    Cardiac murmur    Hypertension    Dizziness    Abnormal nuclear stress test    VHD (valvular heart disease)    Class 3 obesity due to excess calories without serious comorbidity in adult    Essential hypertension    Pulmonary HTN (HCC)    Moderate COPD (chronic obstructive pulmonary disease) (HCC)    Severe obstructive sleep apnea       Past Medical History:   Diagnosis Date    Cardiac murmur     History of cardiovascular stress test 01/17/2018    nfero lateral, ischemic ST-T changes noted. Moderate Inferior wall Ischemia of a medium sized territory. Inferior wall Hypokinesis with low normal LV systolic function. LVEF is 50 %.  History of echocardiogram 01/08/2018    Left ventricular systolic function is normal with an ejection fraction of 55-60%. Grade I diastolic dysfunction. Mild concentric left ventricular hypertrophy. The left atrium is mildly dilated. Moderate aortic stenosis is present. Doppler evaluation reveals moderate aortic, and mild mitral and tricuspid regurgitation. No evidence of pericardial effusion.  Hypertension     Sleep apnea     SOB (shortness of breath)        No past surgical history on file. Family History   Problem Relation Age of Onset    Heart Attack Father         57         Objective: There were no vitals filed for this visit. Inches  [place] -      Gen: No distress. Eyes: PERRL. No sclera icterus. No conjunctival injection. ENT: No discharge. Pharynx clear. External appearance of ears and nose normal.  Neck: Trachea midline. No obvious mass. Resp: No accessory muscle use. No crackles. No wheezes. No rhonchi. No dullness on percussion. CV: Regular rate. Regular rhythm. No murmur or rub. No edema. GI: Non-tender. Non-distended. No hernia. Skin: Warm, dry, normal texture and turgor. No nodule on exposed extremities. Lymph: No cervical LAD. No supraclavicular LAD. M/S: No cyanosis. No clubbing. No joint deformity. Psych: Oriented x 3. No anxiety. Awake. Alert. Intact judgement and insight.     Mallampati Airway Classification:   []1 []2 []3 [x]4        Sleep Complaints/Symptoms:    Normal Bedtime:      Normal Wake Time:   Average Sleep Time:  6-7 hrs    Number of Awakenings:  2-3 / night; NOW 1-2 / NIGHT    Duration of Sleep Complaints: 10 12 YRS years    [x] Snoring     [x] Improved [] Not Improved    [x] Choking/Gasping for Breath  [x] Improved [] Not Improved       [x] Witnessed Apneas              [x] Improved [] Not Improved  [x] Daytime Sleepiness             [x] Improved [] Not Improved  [] Morning Headaches    [] Improved [] Not Improved  [x] Frequent Awakenings       [x] Improved [] Not Improved  [] Jerky Movements   [] Improved [] Not Improved   [] Restless Legs   [] Improved [] Not Improved   [] Difficulty Initiating Sleep  [] Improved [] Not Improved   [] Difficulty Maintaining Sleep  [] Improved [] Not Improved   [] Restless Sleep    [] Improved [] Not Improved   [] Sleep Paralysis    [] Improved [] Not Improved   [] Muscle Weakness w/ Emotion  [] Improved [] Not Improved  [] Other :     CPAP Usage:    [x]  Patient has never worn CPAP  []  Patient has worn CPAP previously but discontinued use  []  Current PAP user,  [years]   []  Patient Tolerates Well   []  Patient Does Not Tolerate     []  Patient Uses CPAP      []  More Than 4 Hours      []  Less Than 4 Hours  []  CPAP/BPAP/ASV Pressure Readings   []  CPAP Pressure      cm H20   []  BPAP Pressure       cm H20   []  ASV Pressure         cm H20      Assessment:      Diagnosis:  DESIRE                   Hypersomnia                    CPAP  8 CM     Plan:        Sleep Study:     [x]  Sleep hygiene/ relaxation methods & CBTi principles review with patient     []  HST - Home Sleep Study   []  PSG - Overnight Diagnostic Polysomnogram     []  CPAP Titration    [] Split Night Study    [] BiLevel Titration    [] ASV - Auto-Servo Ventilation Titration       []  MSLT - Multiple Sleep Latency Test   []  MWT - Maintenance of Wakefulness Test    CPAP Therapy:     []  Patient to be seen for new mask fitting/desensitization   []  AutoPAP Titration    []  CPAP supplies and equipment at ________cmH2O    []  Continue same CPAP pressure  8 CM   []  Change CPAP pressure to _______cm H2O   []  CPAP supplies only, no pressure change   []  Refer for an oral appliance       Medications:       []  Continue current medication    []  Add Medication:  ________________    Follow-Up:     []  No follow up required. Patient to return as needed. []  2 weeks   []  4 weeks   []  2 months   []  4 months   []  6 months   [x]  1 year for CPAP compliance evaluation. Patient to return sooner, as needed. [x]  Follow up after sleep study   []  Other: ______________    No orders of the defined types were placed in this encounter.          Electronically signed by Radha Simmons MD on 4/17/2019 at 9:50 AM

## 2019-04-23 ENCOUNTER — HOSPITAL ENCOUNTER (OUTPATIENT)
Dept: PHYSICAL THERAPY | Age: 59
Setting detail: THERAPIES SERIES
Discharge: HOME | End: 2019-04-23
Payer: MEDICAID

## 2019-04-23 PROCEDURE — 97016 VASOPNEUMATIC DEVICE THERAPY: CPT

## 2019-04-23 PROCEDURE — 97110 THERAPEUTIC EXERCISES: CPT

## 2019-04-23 PROCEDURE — 97124 MASSAGE THERAPY: CPT

## 2019-04-23 NOTE — FLOWSHEET NOTE
Outpatient Physical Therapy  [place]           [x] Phone: [phone]   Fax: [phone]  [place]           [] Phone: [phone]   Fax: [phone]        Physical Therapy Daily Treatment Note  Date:  2019    Patient Name:  Leanne Rene    :  1960  MRN: [id number]  Restrictions/Precautions: Other position/activity restrictions: No formal restrictions  Diagnosis:   Diagnosis: Rt shoulder RC tendonitis  Date of Injury/Surgery: 4 months  Treatment Diagnosis: Treatment Diagnosis: R shoulder pain, impaired function right shoulder, postural weakness    Insurance/Certification information: PT Insurance Information: Man[place] Bent[place]   Referring Physician:  Referring Practitioner: RADHA Easley  Next Doctor Visit:  Unknown  Plan of care signed (Y/N):  N  Outcome Measure: Nikki Brooke   Visit# / total visits:    Pain level: 1/10 currently (ache)  Goals:     POC Date Range:  19 - 19         Long term goals  Time Frame for Long term goals : In 8 weeks, patient will  Long term goal 1: demonstrate compliance and independence w/HEP. Long term goal 2: improve upon Quick Dash score as indication of functional improvement. Long term goal 3: demonstrate right shoulder AROM to WFL/pain-free. Long term goal 4: increase right shoulder strength to >= 4/5 MMGs for improved function and self-care. Summary of Evaluation: Assessment: Patient is a 60 y.o. male w/DX R rotator cuff tendonitis. Pt states that he Woke up approx 4 months ago w/right shoulder pain which has not resolved. Pt reports also having left shoulder pain. Recent right shoulder x-ray in 2019 (-) acute findings. PLOF:  Did not have shoulder pain or significant weakness in arms. Did not have functional limitations. Since the onset of pain, increased arm weakness. Has to compensate while lifting and upper body dressing. Pt reports difficulty sleeping and driving. Will wake from sleep approx 1-2x/wk d/t pain.   Today, patient does present w/symptoms consistent w/RC tendonitis/impingement syndrome and will benefit from physical therapy. Subjective:  Pt states he was more painful this morning. Thinks he over did it with his home stretches. Any changes in Ambulatory Summary Sheet? None        Objective:  Did have some discomfort with endrange flex. Exercises: (No more than 4 columns)   Exercise/Equipment Date 4/16/19  #1 Date 4/23/19 (2) Date           WARM UP                     TABLE                                       STANDING      Chin Tucks 1 x 10 1x10    pec stretch - door 3 x 30 sec 3 x 30 sec    Low rows RTB  2 x 10 RTB  2 x 10    RUE ER RTB  2 x 10 RTB  2 x 10    Seated active flex to 90 degrees  1# 2x10 reps bilat    Seated active scaption to 90 degrees   1# 2x10 reps bilat               PROPRIOCEPTION                                    MODALITIES      vaso   x 15 min              Other Therapeutic Activities/Education:    Postural awareness/instruction. VC/TC/Demo for proper technique and TC for muscle recruitment given to ensure maximum therapeutic benefit and functional outcome. Home Exercise Program:    Chin tucks  Low rows RTB  R shoulder ER RTB  pec stretch door  4/23/19 added seated active flex and scaption to 90 degrees with 1# wts. Provided with handout. Manual Treatments:    PROM right shoulder each direction with distractions    Modalities:    Patient received vasocompression on their right shld for pain and inflammation for 15 min on low pressure. Patient had negative skin reaction afterwards. Communication with other providers:    POC sent to Dr. Ely Banks:  (Response towards treatment session) (Pain Rating)  Verbal and manual cueing on proper performance of the prescribed exercise. No pain following vaso. Assessment: Patient is a 60 y.o. male w/DX R rotator cuff tendonitis.   Pt states that he Woke up approx 4 months ago w/right shoulder pain which has not resolved. Pt reports also having left shoulder pain. Recent right shoulder x-ray in Feb 2019 (-) acute findings. PLOF:  Did not have shoulder pain or significant weakness in arms. Did not have functional limitations. Since the onset of pain, increased arm weakness. Has to compensate while lifting and upper body dressing. Pt reports difficulty sleeping and driving. Will wake from sleep approx 1-2x/wk d/t pain. Today, patient does present w/symptoms consistent w/RC tendonitis/impingement syndrome and will benefit from physical therapy.       Plan for Next Session:    R GH joint mobilization  Postural strengthening  RC PREs as varun  Modalities prn pain management/reduction and soft tissue healing    Time In / Time Out:     1347/1430      Timed Code/Total Treatment Minutes:  28/43, (1) TE, (1) man, (1) vaso      Next Progress Note due:  Every 10 visits      Plan of Care Interventions:  [x] Therapeutic Exercise  [x] Modalities:  [x] Therapeutic Activity     [x] Ultrasound  [x] Estim  [] Gait Training      [] Cervical Traction [] Lumbar Traction  [x] Neuromuscular Re-education    [] Cold/hotpack [] Iontophoresis   [x] Instruction in HEP      [x] Vasopneumatic   [x] Dry Needling    [x] Manual Therapy               [] Aquatic Therapy              Electronically signed by:  Petra Woods PTA 4/23/2019, 1:47 PM

## 2019-04-30 ENCOUNTER — HOSPITAL ENCOUNTER (OUTPATIENT)
Dept: PHYSICAL THERAPY | Age: 59
Discharge: HOME | End: 2019-04-30

## 2019-05-02 ENCOUNTER — HOSPITAL ENCOUNTER (OUTPATIENT)
Dept: PHYSICAL THERAPY | Age: 59
Setting detail: THERAPIES SERIES
Discharge: HOME | End: 2019-05-02
Payer: MEDICAID

## 2019-05-02 PROCEDURE — 97110 THERAPEUTIC EXERCISES: CPT

## 2019-05-02 PROCEDURE — 97016 VASOPNEUMATIC DEVICE THERAPY: CPT

## 2019-05-02 NOTE — FLOWSHEET NOTE
patient does present w/symptoms consistent w/RC tendonitis/impingement syndrome and will benefit from physical therapy. Subjective:  Pt states he was more painful this morning. Thinks he over did it with his home stretches. Pt reports that he can tolerated sleeping on his side at least for a short period now. Pt denies rad symptoms        Any changes in Ambulatory Summary Sheet? None        Objective:  Pt reports discomfort with horizontal abd          Exercises: (No more than 4 columns)   Exercise/Equipment Date 4/16/19  #1 Date 4/23/19 (2) Date 5/2/19 #3           WARM UP                     TABLE                                       STANDING      Chin Tucks 1 x 10 1x10 x10   pec stretch - door 3 x 30 sec 3 x 30 sec 3x30 sec   Low rows RTB  2 x 10 RTB  2 x 10 RTB 2 x10 reps   RUE ER RTB  2 x 10 RTB  2 x 10 RTB 2x10   Seated active flex to 90 degrees  1# 2x10 reps bilat 1# 2 x10 reps bilat   Seated active scaption to 90 degrees   1# 2x10 reps bilat 1# 2x10 reps              PROPRIOCEPTION                                    MODALITIES      vaso   x 15 min x15 min             Other Therapeutic Activities/Education:    Postural awareness/instruction. VC/TC/Demo for proper technique and TC for muscle recruitment given to ensure maximum therapeutic benefit and functional outcome. Home Exercise Program:    Chin tucks  Low rows RTB  R shoulder ER RTB  pec stretch door  4/23/19 added seated active flex and scaption to 90 degrees with 1# wts. Provided with handout. Manual Treatments:        Modalities:        Communication with other providers:        Assessment:  (Response towards treatment session) (Pain Rating)  Verbal and manual cueing on proper performance of the prescribed exercise. No pain following vaso. Assessment: Patient is a 60 y.o. male w/DX R rotator cuff tendonitis. Pt states that he Woke up approx 4 months ago w/right shoulder pain which has not resolved.   Pt reports also having left shoulder pain. Recent right shoulder x-ray in Feb 2019 (-) acute findings. PLOF:  Did not have shoulder pain or significant weakness in arms. Did not have functional limitations. Since the onset of pain, increased arm weakness. Has to compensate while lifting and upper body dressing. Pt reports difficulty sleeping and driving. Will wake from sleep approx 1-2x/wk d/t pain. Today, patient does present w/symptoms consistent w/RC tendonitis/impingement syndrome and will benefit from physical therapy.       Plan for Next Session:    R GH joint mobilization  Postural strengthening  RC PREs as varun  Modalities prn pain management/reduction and soft tissue healing    Time In / Time Out:     100-145    Timed Code/Total Treatment Minutes: 45/30,  TE30, Vaso15      Next Progress Note due:  Every 10 visits      Plan of Care Interventions:  [x] Therapeutic Exercise  [x] Modalities:  [x] Therapeutic Activity     [x] Ultrasound  [x] Estim  [] Gait Training      [] Cervical Traction [] Lumbar Traction  [x] Neuromuscular Re-education    [] Cold/hotpack [] Iontophoresis   [x] Instruction in HEP      [x] Vasopneumatic   [x] Dry Needling    [x] Manual Therapy               [] Aquatic Therapy              Electronically signed by:  Pratik Rudd PTA 5/2/2019, 8:04 AM

## 2019-05-09 ENCOUNTER — HOSPITAL ENCOUNTER (OUTPATIENT)
Dept: PHYSICAL THERAPY | Age: 59
Discharge: HOME | End: 2019-05-09

## 2019-05-09 NOTE — FLOWSHEET NOTE
Physical Therapy  Cancellation/No-show Note  Patient Name:  Prabhu Chung  :  1960   Date:  2019  Cancelled visits to date: 2  No-shows to date: 0    For today's appointment patient:  [x]  Cancelled  []  Rescheduled appointment  []  No-show     Reason given by patient:  []  Patient ill  []  Conflicting appointment  []  No transportation    []  Conflict with work  [x]  No reason given  []  Other:     Comments:      Electronically signed by:  Remy Greer, 2019, 11:03 AM

## 2019-05-14 ENCOUNTER — HOSPITAL ENCOUNTER (OUTPATIENT)
Dept: PHYSICAL THERAPY | Age: 59
Setting detail: THERAPIES SERIES
Discharge: HOME | End: 2019-05-14
Payer: MEDICAID

## 2019-05-14 PROCEDURE — 97112 NEUROMUSCULAR REEDUCATION: CPT

## 2019-05-14 PROCEDURE — 97110 THERAPEUTIC EXERCISES: CPT

## 2019-05-14 PROCEDURE — 97016 VASOPNEUMATIC DEVICE THERAPY: CPT

## 2019-05-14 NOTE — FLOWSHEET NOTE
Outpatient Physical Therapy  [place]           [x] Phone: [phone]   Fax: [phone]  [place]           [] Phone: [phone]   Fax: [phone]        Physical Therapy Daily Treatment Note  Date:  2019    Patient Name:  Wanda Brasher    :  1960  MRN: [id number]  Restrictions/Precautions: Other position/activity restrictions: No formal restrictions  Diagnosis:   Diagnosis: Rt shoulder RC tendonitis  Date of Injury/Surgery: 4 months  Treatment Diagnosis: Treatment Diagnosis: R shoulder pain, impaired function right shoulder, postural weakness    Insurance/Certification information: PT Insurance Information: [facility]   Referring Physician:  Referring Practitioner: RADHA Rivera  Next Doctor Visit:  Unknown  Plan of care signed (Y/N):  N  Outcome Measure: Celso Amaro 23  Visit# / total visits:    Pain level: 0/10 currently   Goals:     POC Date Range:  19 - 19         Long term goals  Time Frame for Long term goals : In 8 weeks, patient will  Long term goal 1: demonstrate compliance and independence w/HEP. Long term goal 2: improve upon Quick Dash score as indication of functional improvement. Long term goal 3: demonstrate right shoulder AROM to WFL/pain-free. Long term goal 4: increase right shoulder strength to >= 4/5 MMGs for improved function and self-care. Summary of Evaluation: Assessment: Patient is a 60 y.o. male w/DX R rotator cuff tendonitis. Pt states that he Woke up approx 4 months ago w/right shoulder pain which has not resolved. Pt reports also having left shoulder pain. Recent right shoulder x-ray in 2019 (-) acute findings. PLOF:  Did not have shoulder pain or significant weakness in arms. Did not have functional limitations. Since the onset of pain, increased arm weakness. Has to compensate while lifting and upper body dressing. Pt reports difficulty sleeping and driving. Will wake from sleep approx 1-2x/wk d/t pain.   Today, patient does present w/symptoms consistent w/RC tendonitis/impingement syndrome and will benefit from physical therapy. Subjective:  Pt states he is doing much better. Still having a little discomfort right shld with certain mvmts. Has been using 2# wts at home. Sleeping has improved. Any changes in Ambulatory Summary Sheet? None        Objective:  PROM flex, abd, ER = WNL, did notice some tightness with IR           Exercises: (No more than 4 columns)   Exercise/Equipment Date 4/16/19  #1 Date 4/23/19 (2) Date 5/2/19 #3 Date 5/14/19 (4)            WARM UP                        TABLE                                                    Chin Tucks 1 x 10 1x10 x10 HEP   pec stretch - door 3 x 30 sec 3 x 30 sec 3x30 sec [id number]   Low rows RTB  2 x 10 RTB  2 x 10 RTB 2 x10 reps GTB 2x10 reps   RUE ER RTB  2 x 10 RTB  2 x 10 RTB 2x10 GTB 2x10 reps   Seated active flex to 90 degrees  1# 2x10 reps bilat 1# 2 x10 reps bilat 2# 2 x10 reps bilat   Seated active scaption to 90 degrees   1# 2x10 reps bilat 1# 2x10 reps 2# 2x10 reps     Prone horizontal abd        2x10 reps   IR towel stretch    10ct x 5 reps                 PROPRIOCEPTION                                          MODALITIES       vaso   x 15 min x15 min  x 15 min              Other Therapeutic Activities/Education:    Postural awareness/instruction. VC/TC/Demo for proper technique and TC for muscle recruitment given to ensure maximum therapeutic benefit and functional outcome. Home Exercise Program:    Chin tucks  Low rows RTB  R shoulder ER RTB  pec stretch door  4/23/19 added seated active flex and scaption to 90 degrees with 1# wts. Provided with handout. 5/14/19 progressed to GTB and added IR stretch    Manual Treatments:  PROM shld flex, abd. ER, IR      Modalities:  Patient received vasocompression on their right shld for pain and inflammation for 15 min on low pressure. Patient had negative skin reaction afterwards.        Communication with other providers:        Assessment:  (Response towards treatment session) (Pain Rating)  Verbal and manual cueing on proper performance of the prescribed exercise. No pain following vaso.     Plan for Next Session:    R GH joint mobilization  Postural strengthening  RC PREs as varun  Modalities prn pain management/reduction and soft tissue healing    Time In / Time Out:     1303/1348    Timed Code/Total Treatment Minutes: 30/45,  (1) TE, (1) NR, (1) vaso      Next Progress Note due:  Every 10 visits      Plan of Care Interventions:  [x] Therapeutic Exercise  [x] Modalities:  [x] Therapeutic Activity     [x] Ultrasound  [x] Estim  [] Gait Training      [] Cervical Traction [] Lumbar Traction  [x] Neuromuscular Re-education    [] Cold/hotpack [] Iontophoresis   [x] Instruction in HEP      [x] Vasopneumatic   [x] Dry Needling    [x] Manual Therapy               [] Aquatic Therapy              Electronically signed by:  Bharti Timmons PTA 5/14/2019, 1:03 PM

## 2020-11-03 PROBLEM — I10 HYPERTENSION: Status: RESOLVED | Noted: 2020-11-03 | Resolved: 2020-11-03

## 2021-03-23 ENCOUNTER — HOSPITAL ENCOUNTER (OUTPATIENT)
Dept: NON INVASIVE DIAGNOSTICS | Age: 61
Discharge: HOME | End: 2021-03-23
Payer: MEDICAID

## 2021-03-23 DIAGNOSIS — R06.02 SHORTNESS OF BREATH: ICD-10-CM

## 2021-03-23 LAB
LV EF: 55 %
LVEF MODALITY: NORMAL

## 2021-03-23 PROCEDURE — 93306 TTE W/DOPPLER COMPLETE: CPT

## 2022-11-14 ENCOUNTER — HOSPITAL ENCOUNTER (OUTPATIENT)
Dept: GENERAL RADIOLOGY | Age: 62
Discharge: HOME | End: 2022-11-14
Payer: MEDICAID

## 2022-11-14 ENCOUNTER — HOSPITAL ENCOUNTER (OUTPATIENT)
Age: 62
Discharge: HOME | End: 2022-11-14
Payer: MEDICAID

## 2022-11-14 DIAGNOSIS — R06.02 SHORTNESS OF BREATH: ICD-10-CM

## 2022-11-14 PROCEDURE — 71046 X-RAY EXAM CHEST 2 VIEWS: CPT

## 2023-08-28 ENCOUNTER — HOSPITAL ENCOUNTER (OUTPATIENT)
Age: 63
Discharge: HOME | End: 2023-08-28
Payer: MEDICAID

## 2023-08-28 ENCOUNTER — HOSPITAL ENCOUNTER (OUTPATIENT)
Dept: GENERAL RADIOLOGY | Age: 63
Discharge: HOME | End: 2023-08-28
Payer: MEDICAID

## 2023-08-28 DIAGNOSIS — R06.02 SOB (SHORTNESS OF BREATH): ICD-10-CM

## 2023-08-28 PROCEDURE — 71046 X-RAY EXAM CHEST 2 VIEWS: CPT

## 2023-11-09 ENCOUNTER — HOSPITAL ENCOUNTER (OUTPATIENT)
Dept: PULMONOLOGY | Age: 63
Discharge: HOME | End: 2023-11-09
Attending: INTERNAL MEDICINE
Payer: MEDICAID

## 2023-11-09 PROCEDURE — 94726 PLETHYSMOGRAPHY LUNG VOLUMES: CPT

## 2023-11-09 PROCEDURE — 94060 EVALUATION OF WHEEZING: CPT

## 2023-11-09 PROCEDURE — 94729 DIFFUSING CAPACITY: CPT

## 2025-02-01 NOTE — PROGRESS NOTES
Patient Name:  Keyon Braxton  Patient :  1960  Patient MRN:  [id number]     Primary Oncologist: Lise Dowell MD  Referring Provider: Rosalio Godinez MD     Date of Service: 2025      Reason for Consult:  elevated HCT.      Chief Complaint:  No chief complaint on file.       Patient Active Problem List:     SOB (shortness of breath)     Cardiac murmur     Dizziness     Abnormal nuclear stress test     VHD (valvular heart disease)     Class 3 obesity due to excess calories without serious comorbidity in adult     Essential hypertension     Pulmonary HTN (HCC)     Moderate COPD (chronic obstructive pulmonary disease) (HCC)     Severe obstructive sleep apnea     HPI:   Keyon Braxton is a pleasant 65 yo male patient who was referred for evaluation of erythrocytosis/polycythemia.  He has been on testosterone in the last 6 months.  PCP has increased HCTZ to 25 mg d/t leg swelling.  He has COPD, sleep apnea and has been using C-PAP at night time.    18 CBC wnl.   24 testosterone 140. PSA 0.86.   25 WBC 10.09, Hg 17.7, HCT 54.6, RBC 6.66, plat 293.   We discuss about the potential SE of testosterone.   We discuss about other etiologies of polycythemia.  I order w/u to r/o MPN/PV and hypernephroma.  We discuss about potential phlebotomy or blood donation.   No biological children. Nephew has testicular cancer.  Overdue for colonoscopy. Will FU with Dr Rodgers.    Past Medical History:   Diagnosis Date    Cardiac murmur     History of cardiovascular stress test 2018    nfero lateral, ischemic ST-T changes noted.Moderate Inferior wall Ischemia of a medium sized territory. Inferior wall Hypokinesis with low normal LV systolic function. LVEF is 50 %.    History of echocardiogram 2018    Left ventricular systolic function is normal with an ejection fraction of 55-60%.Grade I diastolic dysfunction. Mild concentric left ventricular hypertrophy.The left atrium is mildly

## 2025-02-18 ENCOUNTER — INITIAL CONSULT (OUTPATIENT)
Dept: ONCOLOGY | Age: 65
End: 2025-02-18
Payer: MEDICARE

## 2025-02-18 ENCOUNTER — HOSPITAL ENCOUNTER (OUTPATIENT)
Dept: INFUSION THERAPY | Age: 65
Discharge: HOME | End: 2025-02-18
Payer: MEDICARE

## 2025-02-18 VITALS
SYSTOLIC BLOOD PRESSURE: 155 MMHG | HEIGHT: 66 IN | BODY MASS INDEX: 45.58 KG/M2 | WEIGHT: 283.6 LBS | TEMPERATURE: 97.7 F | OXYGEN SATURATION: 95 % | HEART RATE: 72 BPM | DIASTOLIC BLOOD PRESSURE: 79 MMHG

## 2025-02-18 DIAGNOSIS — R71.8 ELEVATED HEMATOCRIT: Primary | ICD-10-CM

## 2025-02-18 DIAGNOSIS — R71.8 ELEVATED HEMATOCRIT: ICD-10-CM

## 2025-02-18 DIAGNOSIS — D75.1 POLYCYTHEMIA: ICD-10-CM

## 2025-02-18 LAB
ALBUMIN SERPL-MCNC: 3.9 G/DL (ref 3.4–5)
ALBUMIN/GLOB SERPL: 1.3 {RATIO} (ref 1.1–2.2)
ALP SERPL-CCNC: 82 U/L (ref 40–129)
ALT SERPL-CCNC: 26 U/L (ref 10–40)
ANION GAP SERPL CALCULATED.3IONS-SCNC: 11 MMOL/L (ref 9–17)
AST SERPL-CCNC: 24 U/L (ref 15–37)
BASOPHILS # BLD: 0.02 K/UL
BASOPHILS NFR BLD: 0 % (ref 0–1)
BILIRUB SERPL-MCNC: 0.6 MG/DL (ref 0–1)
BUN SERPL-MCNC: 23 MG/DL (ref 7–20)
CALCIUM SERPL-MCNC: 9.7 MG/DL (ref 8.3–10.6)
CHLORIDE SERPL-SCNC: 102 MMOL/L (ref 99–110)
CO2 SERPL-SCNC: 28 MMOL/L (ref 21–32)
CREAT SERPL-MCNC: 1.1 MG/DL (ref 0.8–1.3)
EOSINOPHIL # BLD: 0.08 K/UL
EOSINOPHILS RELATIVE PERCENT: 1 % (ref 0–3)
ERYTHROCYTE [DISTWIDTH] IN BLOOD BY AUTOMATED COUNT: 19.7 % (ref 11.7–14.9)
GFR, ESTIMATED: 66 ML/MIN/1.73M2
GLUCOSE SERPL-MCNC: 100 MG/DL (ref 74–99)
HCT VFR BLD AUTO: 54.2 % (ref 42–52)
HGB BLD-MCNC: 17.8 G/DL (ref 13.5–18)
LYMPHOCYTES NFR BLD: 1.55 K/UL
LYMPHOCYTES RELATIVE PERCENT: 19 % (ref 24–44)
MCH RBC QN AUTO: 28.1 PG (ref 27–31)
MCHC RBC AUTO-ENTMCNC: 32.8 G/DL (ref 32–36)
MCV RBC AUTO: 85.5 FL (ref 78–100)
MONOCYTES NFR BLD: 0.83 K/UL
MONOCYTES NFR BLD: 10 % (ref 0–4)
NEUTROPHILS NFR BLD: 70 % (ref 36–66)
NEUTS SEG NFR BLD: 5.85 K/UL
PLATELET # BLD AUTO: 221 K/UL (ref 140–440)
PMV BLD AUTO: 9.4 FL (ref 7.5–11.1)
POTASSIUM SERPL-SCNC: 3.9 MMOL/L (ref 3.5–5.1)
PROT SERPL-MCNC: 6.9 G/DL (ref 6.4–8.2)
RBC # BLD AUTO: 6.34 M/UL (ref 4.6–6.2)
SODIUM SERPL-SCNC: 141 MMOL/L (ref 136–145)
WBC OTHER # BLD: 8.3 K/UL (ref 4–10.5)

## 2025-02-18 PROCEDURE — 3078F DIAST BP <80 MM HG: CPT | Performed by: INTERNAL MEDICINE

## 2025-02-18 PROCEDURE — 85025 COMPLETE CBC W/AUTO DIFF WBC: CPT

## 2025-02-18 PROCEDURE — 36415 COLL VENOUS BLD VENIPUNCTURE: CPT

## 2025-02-18 PROCEDURE — 3017F COLORECTAL CA SCREEN DOC REV: CPT | Performed by: INTERNAL MEDICINE

## 2025-02-18 PROCEDURE — 99202 OFFICE O/P NEW SF 15 MIN: CPT

## 2025-02-18 PROCEDURE — G8427 DOCREV CUR MEDS BY ELIG CLIN: HCPCS | Performed by: INTERNAL MEDICINE

## 2025-02-18 PROCEDURE — 80053 COMPREHEN METABOLIC PANEL: CPT

## 2025-02-18 PROCEDURE — G8417 CALC BMI ABV UP PARAM F/U: HCPCS | Performed by: INTERNAL MEDICINE

## 2025-02-18 PROCEDURE — 99204 OFFICE O/P NEW MOD 45 MIN: CPT | Performed by: INTERNAL MEDICINE

## 2025-02-18 PROCEDURE — 1123F ACP DISCUSS/DSCN MKR DOCD: CPT | Performed by: INTERNAL MEDICINE

## 2025-02-18 PROCEDURE — 3077F SYST BP >= 140 MM HG: CPT | Performed by: INTERNAL MEDICINE

## 2025-02-18 PROCEDURE — 4004F PT TOBACCO SCREEN RCVD TLK: CPT | Performed by: INTERNAL MEDICINE

## 2025-02-18 PROCEDURE — 82668 ASSAY OF ERYTHROPOIETIN: CPT

## 2025-02-18 RX ORDER — OXYBUTYNIN CHLORIDE 10 MG/1
TABLET, EXTENDED RELEASE ORAL
COMMUNITY
Start: 2025-01-15

## 2025-02-18 RX ORDER — SILDENAFIL CITRATE 20 MG/1
TABLET ORAL
COMMUNITY
Start: 2025-02-04

## 2025-02-18 RX ORDER — FINASTERIDE 5 MG/1
TABLET, FILM COATED ORAL
COMMUNITY
Start: 2025-01-15

## 2025-02-18 RX ORDER — TESTOSTERONE 1.62 MG/G
GEL TRANSDERMAL
COMMUNITY
Start: 2025-01-07

## 2025-02-18 ASSESSMENT — PATIENT HEALTH QUESTIONNAIRE - PHQ9
SUM OF ALL RESPONSES TO PHQ QUESTIONS 1-9: 0
1. LITTLE INTEREST OR PLEASURE IN DOING THINGS: NOT AT ALL
SUM OF ALL RESPONSES TO PHQ9 QUESTIONS 1 & 2: 0
2. FEELING DOWN, DEPRESSED OR HOPELESS: NOT AT ALL
SUM OF ALL RESPONSES TO PHQ QUESTIONS 1-9: 0

## 2025-02-18 NOTE — PROGRESS NOTES
MA Rooming Questions  Patient: Keyon Braxton  MRN: [id number]    Date: 2/18/2025      NEW PATIENT     5. Did the patient have a depression screening completed today? Yes    PHQ-9 Total Score: 0 (2/18/2025  2:09 PM)       PHQ-9 Given to (if applicable):               PHQ-9 Score (if applicable):                     [] Positive     []  Negative              Does question #9 need addressed (if applicable)                     [] Yes    []  No               Jackie Banegas, CMA

## 2025-02-20 LAB — EPO SERPL-ACNC: 7 MU/ML (ref 4–27)

## 2025-02-25 ENCOUNTER — HOSPITAL ENCOUNTER (OUTPATIENT)
Dept: ULTRASOUND IMAGING | Age: 65
Discharge: HOME | End: 2025-02-25
Payer: MEDICARE

## 2025-02-25 DIAGNOSIS — R71.8 ELEVATED HEMATOCRIT: ICD-10-CM

## 2025-02-25 PROCEDURE — 76700 US EXAM ABDOM COMPLETE: CPT

## 2025-03-24 ENCOUNTER — HOSPITAL ENCOUNTER (OUTPATIENT)
Dept: INFUSION THERAPY | Age: 65
Discharge: HOME | End: 2025-03-24

## 2025-03-24 ENCOUNTER — OFFICE VISIT (OUTPATIENT)
Dept: ONCOLOGY | Age: 65
End: 2025-03-24
Payer: MEDICARE

## 2025-03-24 VITALS
BODY MASS INDEX: 46.54 KG/M2 | HEART RATE: 61 BPM | HEIGHT: 66 IN | OXYGEN SATURATION: 91 % | DIASTOLIC BLOOD PRESSURE: 82 MMHG | WEIGHT: 289.6 LBS | TEMPERATURE: 97.5 F | SYSTOLIC BLOOD PRESSURE: 135 MMHG

## 2025-03-24 DIAGNOSIS — R71.8 ELEVATED HEMATOCRIT: Primary | ICD-10-CM

## 2025-03-24 PROCEDURE — G8417 CALC BMI ABV UP PARAM F/U: HCPCS | Performed by: INTERNAL MEDICINE

## 2025-03-24 PROCEDURE — 3079F DIAST BP 80-89 MM HG: CPT | Performed by: INTERNAL MEDICINE

## 2025-03-24 PROCEDURE — 4004F PT TOBACCO SCREEN RCVD TLK: CPT | Performed by: INTERNAL MEDICINE

## 2025-03-24 PROCEDURE — G8427 DOCREV CUR MEDS BY ELIG CLIN: HCPCS | Performed by: INTERNAL MEDICINE

## 2025-03-24 PROCEDURE — 99213 OFFICE O/P EST LOW 20 MIN: CPT | Performed by: INTERNAL MEDICINE

## 2025-03-24 PROCEDURE — 1123F ACP DISCUSS/DSCN MKR DOCD: CPT | Performed by: INTERNAL MEDICINE

## 2025-03-24 PROCEDURE — 3017F COLORECTAL CA SCREEN DOC REV: CPT | Performed by: INTERNAL MEDICINE

## 2025-03-24 PROCEDURE — 3075F SYST BP GE 130 - 139MM HG: CPT | Performed by: INTERNAL MEDICINE

## 2025-03-24 ASSESSMENT — PATIENT HEALTH QUESTIONNAIRE - PHQ9
SUM OF ALL RESPONSES TO PHQ QUESTIONS 1-9: 0
SUM OF ALL RESPONSES TO PHQ QUESTIONS 1-9: 0
2. FEELING DOWN, DEPRESSED OR HOPELESS: NOT AT ALL
1. LITTLE INTEREST OR PLEASURE IN DOING THINGS: NOT AT ALL
SUM OF ALL RESPONSES TO PHQ QUESTIONS 1-9: 0
SUM OF ALL RESPONSES TO PHQ QUESTIONS 1-9: 0

## 2025-03-24 NOTE — PROGRESS NOTES
MA Rooming Questions  Patient: Keyon Braxton  MRN: [id number]    Date: 3/24/2025        1. Do you have any new issues?   no         2. Do you need any refills on medications?    no    3. Have you had any imaging done since your last visit?   yes - labs 2/18 us 2/25    4. Have you been hospitalized or seen in the emergency room since your last visit here?   no    5. Did the patient have a depression screening completed today? Yes    PHQ-9 Total Score: 0 (3/24/2025  3:40 PM)       PHQ-9 Given to (if applicable):               PHQ-9 Score (if applicable):                     [] Positive     []  Negative              Does question #9 need addressed (if applicable)                     [] Yes    []  No               Jackie Banegas, CMA      
pain, shortness of breath or palpitation.  No headache or dizzy spell.  No specific bone pain.  No melena or hematochezia.  Denied any dysuria or hematuria.    Past Medical History:   Diagnosis Date    Cardiac murmur     COPD (chronic obstructive pulmonary disease) ([facility])     History of cardiovascular stress test 01/17/2018    nfero lateral, ischemic ST-T changes noted.Moderate Inferior wall Ischemia of a medium sized territory. Inferior wall Hypokinesis with low normal LV systolic function. LVEF is 50 %.    History of echocardiogram 01/08/2018    Left ventricular systolic function is normal with an ejection fraction of 55-60%.Grade I diastolic dysfunction. Mild concentric left ventricular hypertrophy.The left atrium is mildly dilated.Moderate aortic stenosis is present.Doppler evaluation reveals moderate aortic, and mild mitral and tricuspid regurgitation.No evidence of pericardial effusion.    Hyperlipidemia     Hypertension     Obesity     Sleep apnea     SOB (shortness of breath)      Past Surgical History:   Procedure Laterality Date    CATARACT EXTRACTION Bilateral      Social History     Tobacco Use    Smoking status: Former     Current packs/day: 0.00     Average packs/day: 1 pack/day for 40.0 years (40.0 ttl pk-yrs)     Types: Cigarettes     Start date: 1975     Quit date: 2015     Years since quitting: 10.1     Passive exposure: Current    Smokeless tobacco: Current     Types: Snuff   Substance Use Topics    Alcohol use: No    Drug use: No     Family History   Problem Relation Age of Onset    Heart Attack Father         62      Review of Systems:  The remainder ROS unremarkable.     Vital Signs: There were no vitals taken for this visit.     Physical Exam:  CONSTITUTIONAL: awake, alert, cooperative, no apparent distress   EYES: pupils equal, round and reactive to light, sclera clear and conjunctiva normal  ENT: Normocephalic, without obvious abnormality, atraumatic  NECK: supple, symmetrical, no jugular

## 2025-07-22 LAB
BCR-ABL QUANTITATIVE: NORMAL
CALR MUTATION: NORMAL
JAK2 EXONS 12-15 MUTATION: NORMAL
JAK2 GENE MUTATION QUAL: NORMAL
MPL 515 MUTATION: NORMAL

## 2025-07-24 ENCOUNTER — HOSPITAL ENCOUNTER (EMERGENCY)
Age: 65
Discharge: HOME | End: 2025-07-24
Payer: MEDICARE

## 2025-07-24 VITALS
DIASTOLIC BLOOD PRESSURE: 74 MMHG | TEMPERATURE: 98.8 F | OXYGEN SATURATION: 98 % | HEIGHT: 66 IN | WEIGHT: 280 LBS | RESPIRATION RATE: 18 BRPM | BODY MASS INDEX: 45 KG/M2 | SYSTOLIC BLOOD PRESSURE: 154 MMHG | HEART RATE: 98 BPM

## 2025-07-24 DIAGNOSIS — D75.1 POLYCYTHEMIA: Primary | ICD-10-CM

## 2025-07-24 LAB
BASOPHILS # BLD: 0.06 K/UL
BASOPHILS NFR BLD: 1 % (ref 0–1)
EOSINOPHIL # BLD: 0.06 K/UL
EOSINOPHILS RELATIVE PERCENT: 1 % (ref 0–3)
ERYTHROCYTE [DISTWIDTH] IN BLOOD BY AUTOMATED COUNT: 14.7 % (ref 11.7–14.9)
HCT VFR BLD AUTO: 59.8 % (ref 42–52)
HGB BLD-MCNC: 20 G/DL (ref 13.5–18)
IMM GRANULOCYTES # BLD AUTO: 0.15 K/UL
IMM GRANULOCYTES NFR BLD: 2 %
LYMPHOCYTES NFR BLD: 1.82 K/UL
LYMPHOCYTES RELATIVE PERCENT: 18 % (ref 24–44)
MCH RBC QN AUTO: 28.8 PG (ref 27–31)
MCHC RBC AUTO-ENTMCNC: 33.4 G/DL (ref 32–36)
MCV RBC AUTO: 86.2 FL (ref 78–100)
MONOCYTES NFR BLD: 0.81 K/UL
MONOCYTES NFR BLD: 8 % (ref 0–5)
NEUTROPHILS NFR BLD: 72 % (ref 36–66)
NEUTS SEG NFR BLD: 7.36 K/UL
PLATELET # BLD AUTO: 285 K/UL (ref 140–440)
PMV BLD AUTO: 9.2 FL (ref 7.5–11.1)
RBC # BLD AUTO: 6.94 M/UL (ref 4.6–6.2)
WBC OTHER # BLD: 10.3 K/UL (ref 4–10.5)

## 2025-07-24 PROCEDURE — 2580000003 HC RX 258: Performed by: NURSE PRACTITIONER

## 2025-07-24 PROCEDURE — 99284 EMERGENCY DEPT VISIT MOD MDM: CPT

## 2025-07-24 PROCEDURE — 96360 HYDRATION IV INFUSION INIT: CPT

## 2025-07-24 PROCEDURE — 96361 HYDRATE IV INFUSION ADD-ON: CPT

## 2025-07-24 PROCEDURE — 85025 COMPLETE CBC W/AUTO DIFF WBC: CPT

## 2025-07-24 PROCEDURE — 6370000000 HC RX 637 (ALT 250 FOR IP): Performed by: NURSE PRACTITIONER

## 2025-07-24 RX ORDER — ASPIRIN 81 MG/1
324 TABLET, CHEWABLE ORAL ONCE
Status: COMPLETED | OUTPATIENT
Start: 2025-07-24 | End: 2025-07-24

## 2025-07-24 RX ORDER — 0.9 % SODIUM CHLORIDE 0.9 %
1000 INTRAVENOUS SOLUTION INTRAVENOUS ONCE
Status: COMPLETED | OUTPATIENT
Start: 2025-07-24 | End: 2025-07-24

## 2025-07-24 RX ADMIN — ASPIRIN 81 MG 324 MG: 81 TABLET ORAL at 14:48

## 2025-07-24 RX ADMIN — SODIUM CHLORIDE 1000 ML: 9 INJECTION, SOLUTION INTRAVENOUS at 14:07

## 2025-07-24 ASSESSMENT — PAIN - FUNCTIONAL ASSESSMENT
PAIN_FUNCTIONAL_ASSESSMENT: NONE - DENIES PAIN

## 2025-07-24 ASSESSMENT — PAIN SCALES - GENERAL: PAINLEVEL_OUTOF10: 0

## 2025-07-24 ASSESSMENT — ENCOUNTER SYMPTOMS
CHEST TIGHTNESS: 0
ANAL BLEEDING: 0
SHORTNESS OF BREATH: 0

## 2025-07-24 ASSESSMENT — LIFESTYLE VARIABLES
HOW MANY STANDARD DRINKS CONTAINING ALCOHOL DO YOU HAVE ON A TYPICAL DAY: PATIENT DOES NOT DRINK
HOW OFTEN DO YOU HAVE A DRINK CONTAINING ALCOHOL: NEVER

## 2025-07-24 NOTE — ED PROVIDER NOTES
[facility] EMERGENCY DEPARTMENT  EMERGENCY DEPARTMENT ENCOUNTER      Pt Name: Keyon Braxton  MRN: [id number]  Birthdate 1960  Date of evaluation: 7/24/2025  Provider: SHELLEY Mcdowell - CNP  PCP: Rosalio Wakefield MD  Note Started: 1:10 PM EDT 7/24/25    I am the Primary Clinician of Record.  JEAN-PAUL. I have evaluated this patient.      CHIEF COMPLAINT       Chief Complaint   Patient presents with    Abnormal Lab     High hct, sent in by Urology     HISTORY OF PRESENT ILLNESS: 1 or more Elements   History from : Patient         Keyon Braxton is a 64 y.o. male who presents to the emergency department with concern of high hematocrit.  The patient is currently taking testosterone.  He states that he was supposed to have blood dumped on previously but did not do it.  He was notified by his urologist office today that he needed to come to the emergency department secondary to high hematocrit and hemoglobin that was a result of a routine blood draw yesterday.  Patient denies any symptoms.  No shortness of breath, no chest pain, no vision changes.    Nursing Notes were all reviewed and agreed with or any disagreements were addressed in the HPI.    REVIEW OF SYSTEMS :    Review of Systems   Constitutional:  Negative for fatigue and fever.   Respiratory:  Negative for chest tightness and shortness of breath.    Cardiovascular:  Negative for chest pain.   Gastrointestinal:  Negative for anal bleeding.     Positives and Pertinent negatives as per HPI.     SURGICAL HISTORY     Past Surgical History:   Procedure Laterality Date    CATARACT EXTRACTION Bilateral        CURRENTMEDICATIONS       Previous Medications    ACETYLCYSTEINE (MUCOMYST) 10 % NEBULIZER SOLUTION    Inhale 4 mLs into the lungs 3 times daily    ALBUTEROL SULFATE HFA (PROVENTIL HFA) 108 (90 BASE) MCG/ACT INHALER    Inhale 2 puffs into the lungs every 4 hours as needed for Wheezing or Shortness of Breath With spacer

## 2025-08-05 ENCOUNTER — CLINICAL DOCUMENTATION (OUTPATIENT)
Dept: ONCOLOGY | Age: 65
End: 2025-08-05

## 2025-08-05 ENCOUNTER — OFFICE VISIT (OUTPATIENT)
Dept: ONCOLOGY | Age: 65
End: 2025-08-05
Payer: MEDICARE

## 2025-08-05 ENCOUNTER — CLINICAL DOCUMENTATION (OUTPATIENT)
Dept: INFUSION THERAPY | Age: 65
End: 2025-08-05

## 2025-08-05 ENCOUNTER — HOSPITAL ENCOUNTER (OUTPATIENT)
Dept: INFUSION THERAPY | Age: 65
Discharge: HOME | End: 2025-08-05
Payer: MEDICARE

## 2025-08-05 VITALS
DIASTOLIC BLOOD PRESSURE: 77 MMHG | HEIGHT: 66 IN | OXYGEN SATURATION: 96 % | WEIGHT: 284.4 LBS | HEART RATE: 76 BPM | BODY MASS INDEX: 45.71 KG/M2 | SYSTOLIC BLOOD PRESSURE: 138 MMHG | TEMPERATURE: 98 F

## 2025-08-05 DIAGNOSIS — D75.1 POLYCYTHEMIA: Primary | ICD-10-CM

## 2025-08-05 DIAGNOSIS — R71.8 ELEVATED HEMATOCRIT: ICD-10-CM

## 2025-08-05 DIAGNOSIS — R71.8 ELEVATED HEMATOCRIT: Primary | ICD-10-CM

## 2025-08-05 DIAGNOSIS — D75.1 POLYCYTHEMIA: ICD-10-CM

## 2025-08-05 LAB
BASOPHILS # BLD: 0.02 K/UL
BASOPHILS NFR BLD: 0 % (ref 0–1)
EOSINOPHIL # BLD: 0.09 K/UL
EOSINOPHILS RELATIVE PERCENT: 1 % (ref 0–3)
ERYTHROCYTE [DISTWIDTH] IN BLOOD BY AUTOMATED COUNT: 15.2 % (ref 11.7–14.9)
HCT VFR BLD AUTO: 54.8 % (ref 42–52)
HGB BLD-MCNC: 18.3 G/DL (ref 13.5–18)
LYMPHOCYTES NFR BLD: 1.61 K/UL
LYMPHOCYTES RELATIVE PERCENT: 24 % (ref 24–44)
MCH RBC QN AUTO: 29.2 PG (ref 27–31)
MCHC RBC AUTO-ENTMCNC: 33.4 G/DL (ref 32–36)
MCV RBC AUTO: 87.4 FL (ref 78–100)
MONOCYTES NFR BLD: 0.64 K/UL
MONOCYTES NFR BLD: 9 % (ref 0–5)
NEUTROPHILS NFR BLD: 66 % (ref 36–66)
NEUTS SEG NFR BLD: 4.49 K/UL
PLATELET # BLD AUTO: 224 K/UL (ref 140–440)
PMV BLD AUTO: 9.3 FL (ref 7.5–11.1)
RBC # BLD AUTO: 6.27 M/UL (ref 4.6–6.2)
WBC OTHER # BLD: 6.9 K/UL (ref 4–10.5)

## 2025-08-05 PROCEDURE — 36415 COLL VENOUS BLD VENIPUNCTURE: CPT

## 2025-08-05 PROCEDURE — 85025 COMPLETE CBC W/AUTO DIFF WBC: CPT

## 2025-08-05 PROCEDURE — 3075F SYST BP GE 130 - 139MM HG: CPT | Performed by: INTERNAL MEDICINE

## 2025-08-05 PROCEDURE — 1123F ACP DISCUSS/DSCN MKR DOCD: CPT | Performed by: INTERNAL MEDICINE

## 2025-08-05 PROCEDURE — 99213 OFFICE O/P EST LOW 20 MIN: CPT | Performed by: INTERNAL MEDICINE

## 2025-08-05 PROCEDURE — 3078F DIAST BP <80 MM HG: CPT | Performed by: INTERNAL MEDICINE

## 2025-08-05 PROCEDURE — 99212 OFFICE O/P EST SF 10 MIN: CPT

## 2025-08-05 RX ORDER — 0.9 % SODIUM CHLORIDE 0.9 %
250 INTRAVENOUS SOLUTION INTRAVENOUS ONCE
Status: CANCELLED | OUTPATIENT
Start: 2025-08-05 | End: 2025-08-05

## 2025-08-07 ENCOUNTER — HOSPITAL ENCOUNTER (OUTPATIENT)
Dept: INFUSION THERAPY | Age: 65
Setting detail: INFUSION SERIES
Discharge: HOME | End: 2025-08-07
Payer: MEDICARE

## 2025-08-07 VITALS
SYSTOLIC BLOOD PRESSURE: 124 MMHG | OXYGEN SATURATION: 96 % | DIASTOLIC BLOOD PRESSURE: 72 MMHG | HEART RATE: 72 BPM | RESPIRATION RATE: 16 BRPM | TEMPERATURE: 97.3 F

## 2025-08-07 DIAGNOSIS — R71.8 ELEVATED HEMATOCRIT: Primary | ICD-10-CM

## 2025-08-07 DIAGNOSIS — D75.1 POLYCYTHEMIA: ICD-10-CM

## 2025-08-07 PROCEDURE — 99195 PHLEBOTOMY: CPT

## 2025-08-07 RX ORDER — 0.9 % SODIUM CHLORIDE 0.9 %
250 INTRAVENOUS SOLUTION INTRAVENOUS ONCE
Status: CANCELLED | OUTPATIENT
Start: 2025-08-07 | End: 2025-08-07